# Patient Record
Sex: MALE | Race: WHITE | NOT HISPANIC OR LATINO | Employment: FULL TIME | URBAN - METROPOLITAN AREA
[De-identification: names, ages, dates, MRNs, and addresses within clinical notes are randomized per-mention and may not be internally consistent; named-entity substitution may affect disease eponyms.]

---

## 2017-03-07 ENCOUNTER — TELEPHONE (OUTPATIENT)
Dept: FAMILY MEDICINE | Facility: CLINIC | Age: 62
End: 2017-03-07

## 2017-03-07 NOTE — TELEPHONE ENCOUNTER
Panel Management Review      Patient has the following on his problem list: None      Composite cancer screening  Chart review shows that this patient is due/due soon for the following Colonoscopy  Summary:    Patient is due/failing the following:   COLONOSCOPY    Action needed:   Patient needs referral/order: Colonoscopy    Type of outreach:    Phone, left message for patient to call back.     Questions for provider review:    None                                                                                                                                    Grisel Dutton MA       Chart routed to Care Team [id number] .

## 2017-04-04 ENCOUNTER — TELEPHONE (OUTPATIENT)
Dept: FAMILY MEDICINE | Facility: CLINIC | Age: 62
End: 2017-04-04

## 2017-04-04 NOTE — LETTER
[address] [address]  [phone]  April 18, 2017    Chris Camarena  [facility] [address]    Dear Chris,    I care about your health and have reviewed your health plan. I have reviewed your medical conditions, medication list, and lab results and am making recommendations based on this review, to better manage your health.    You are in particular need of attention regarding:  -Colon Cancer Screening    I am recommending that you:  -schedule a COLONOSCOPY to look for colon cancer (due every 10 years or 5 years in higher risk situations.)   Colon cancer is now the second leading cause of death in the [place] for both men and women and there are over 130,000 new cases and 50,000 deaths per year from colon cancer.  Colonoscopies can prevent 90-95% of these deaths.  Problem lesions can be removed before they ever become cancer.  This test is not only looking for cancer, but also getting rid of precancerious lesions.  If you do not wish to do a colonoscopy or cannot afford to do one, at this time, there is another option. It is called a FIT test or Fecal Immunochemical Occult Blood Test (take home stool sample kit).  It does not replace the colonoscopy for colorectal cancer screening, but it can detect hidden bleeding in the lower colon.  It does need to be repeated every year and if a positive result is obtained, you would be referred for a colonoscopy.  If you have completed either one of these tests at another facility, please have the records sent to our clinic so that we can best coordinate your care.    Here is a list of Health Maintenance topics that are due now or due soon:  Health Maintenance Due   Topic Date Due     TETANUS IMMUNIZATION (SYSTEM ASSIGNED)  01/18/1974     ADVANCE DIRECTIVE PLANNING Q5 YRS (NO INBASKET)  01/18/1974     COLON CANCER SCREEN (SYSTEM ASSIGNED)  01/18/2006       Please call us  at [phone] (or use [employer]) to address the above recommendations.     Thank you for trusting [facility] and we appreciate the opportunity to serve you.  We look forward to supporting your healthcare needs in the future.    Healthy Regards,    Luca Colunga MD  [id number]

## 2017-04-04 NOTE — TELEPHONE ENCOUNTER
Panel Management Review      Patient has the following on his problem list: None      Composite cancer screening  Chart review shows that this patient is due/due soon for the following Colonoscopy  Summary:    Patient is due/failing the following:   COLONOSCOPY    Action needed:   Patient needs referral/order: Colonoscopy    Type of outreach:    Phone, left message for patient to call back.     Questions for provider review:    None                                                                                                                                    Grisel Dutton MA       Chart routed to Care Team .[id number]

## 2017-05-30 ENCOUNTER — TELEPHONE (OUTPATIENT)
Dept: FAMILY MEDICINE | Facility: CLINIC | Age: 62
End: 2017-05-30

## 2017-05-30 NOTE — TELEPHONE ENCOUNTER
Panel Management Review      Patient has the following on his problem list: None      Composite cancer screening  Chart review shows that this patient is due/due soon for the following Colonoscopy  Summary:    Patient is due/failing the following:   COLONOSCOPY    Action needed:   Patient needs referral/order: Colonoscopy    Type of outreach:    Phone, left message for patient to call back.     Questions for provider review:    None                                                                                                                                    Grisel Dutton MA       Chart routed to Care Team [id number] .

## 2017-05-30 NOTE — LETTER
[facility]  [address] [place] [address]  [phone]  June 6, 2017    Chris Camarena  [facility] [address]    Dear Chris,    I care about your health and have reviewed your health plan. I have reviewed your medical conditions, medication list, and lab results and am making recommendations based on this review, to better manage your health.    You are in particular need of attention regarding:  -Colon Cancer Screening    I am recommending that you:  {recommendations:-schedule a COLONOSCOPY to look for colon cancer (due every 10 years or 5 years in higher risk situations.)   Colon cancer is now the second leading cause of death in the [place] for both men and women and there are over 130,000 new cases and 50,000 deaths per year from colon cancer.  Colonoscopies can prevent 90-95% of these deaths.  Problem lesions can be removed before they ever become cancer.  This test is not only looking for cancer, but also getting rid of precancerious lesions.  If you do not wish to do a colonoscopy or cannot afford to do one, at this time, there is another option. It is called a FIT test or Fecal Immunochemical Occult Blood Test (take home stool sample kit).  It does not replace the colonoscopy for colorectal cancer screening, but it can detect hidden bleeding in the lower colon.  It does need to be repeated every year and if a positive result is obtained, you would be referred for a colonoscopy.  If you have completed either one of these tests at another facility, please have the records sent to our clinic so that we can best coordinate your care.    Here is a list of Health Maintenance topics that are due now or due soon:  Health Maintenance Due   Topic Date Due     TETANUS IMMUNIZATION (SYSTEM ASSIGNED)  01/18/1974     ADVANCE DIRECTIVE PLANNING Q5 YRS  01/18/1974     COLON CANCER SCREEN (SYSTEM ASSIGNED)  01/18/2006       Please call  us at [phone] (or use [employer]) to address the above recommendations.     Thank you for trusting [facility] and we appreciate the opportunity to serve you.  We look forward to supporting your healthcare needs in the future.    Healthy Regards,    Luca Colunga MD

## 2017-08-08 ENCOUNTER — TELEPHONE (OUTPATIENT)
Dept: FAMILY MEDICINE | Facility: CLINIC | Age: 62
End: 2017-08-08

## 2017-08-08 NOTE — LETTER
[facility]  [address] [place] [address]  [phone]  September 15, 2017    Chris Camarena  [facility] [address]    Dear Chris,    I care about your health and have reviewed your health plan. I have reviewed your medical conditions, medication list, and lab results and am making recommendations based on this review, to better manage your health.    You are in particular need of attention regarding:  -Colon Cancer Screening    I am recommending that you:  {recommendations:-schedule a COLONOSCOPY to look for colon cancer (due every 10 years or 5 years in higher risk situations.)   Colon cancer is now the second leading cause of death in the [place] for both men and women and there are over 130,000 new cases and 50,000 deaths per year from colon cancer.  Colonoscopies can prevent 90-95% of these deaths.  Problem lesions can be removed before they ever become cancer.  This test is not only looking for cancer, but also getting rid of precancerious lesions.  If you do not wish to do a colonoscopy or cannot afford to do one, at this time, there is another option. It is called a FIT test or Fecal Immunochemical Occult Blood Test (take home stool sample kit).  It does not replace the colonoscopy for colorectal cancer screening, but it can detect hidden bleeding in the lower colon.  It does need to be repeated every year and if a positive result is obtained, you would be referred for a colonoscopy.  If you have completed either one of these tests at another facility, please have the records sent to our clinic so that we can best coordinate your care.    Here is a list of Health Maintenance topics that are due now or due soon:  Health Maintenance Due   Topic Date Due     TETANUS IMMUNIZATION (SYSTEM ASSIGNED)  01/18/1974     COLON CANCER SCREEN (SYSTEM ASSIGNED)  01/18/2006     ADVANCE DIRECTIVE PLANNING Q5 YRS  01/18/2011     INFLUENZA  VACCINE (SYSTEM ASSIGNED)  09/01/2017       Please call us at [phone] (or use [employer]) to address the above recommendations.     Thank you for trusting [facility] and we appreciate the opportunity to serve you.  We look forward to supporting your healthcare needs in the future.    Healthy Regards,    [facility]

## 2017-08-08 NOTE — TELEPHONE ENCOUNTER
Panel Management Review      Patient has the following on his problem list: None      Composite cancer screening  Chart review shows that this patient is due/due soon for the following Colonoscopy  Summary:    Patient is due/failing the following:   COLONOSCOPY    Action needed:   Patient needs referral/order: Colonoscopy    Type of outreach:    Phone, left message for patient to call back.     Questions for provider review:    None                                                                                                                                    Grisel Dutton MA       Chart routed to Care Team [id number]   .

## 2019-04-03 ENCOUNTER — TELEPHONE (OUTPATIENT)
Dept: PEDIATRICS | Facility: CLINIC | Age: 64
End: 2019-04-03

## 2019-04-03 NOTE — TELEPHONE ENCOUNTER
Reason for Call:  Other appointment    Detailed comments: Patient sent in a web request looking to schedule an appointment for upper back pain and to re-establish care. Last office visit was May of 2014. Would you be willing to re-establish care with this patient? If so, please give patient a call to schedule. Thank you.    Phone Number Patient can be reached at: Home number on file [phone] (home)    Best Time: Anytime.    Can we leave a detailed message on this number? YES    Call taken on 4/3/2019 at 4:08 PM by Prateek Kang

## 2019-04-15 ENCOUNTER — ANCILLARY PROCEDURE (OUTPATIENT)
Dept: GENERAL RADIOLOGY | Facility: CLINIC | Age: 64
End: 2019-04-15
Attending: INTERNAL MEDICINE
Payer: COMMERCIAL

## 2019-04-15 ENCOUNTER — OFFICE VISIT (OUTPATIENT)
Dept: PEDIATRICS | Facility: CLINIC | Age: 64
End: 2019-04-15
Payer: COMMERCIAL

## 2019-04-15 VITALS
SYSTOLIC BLOOD PRESSURE: 138 MMHG | OXYGEN SATURATION: 96 % | TEMPERATURE: 98.7 F | BODY MASS INDEX: 36.73 KG/M2 | DIASTOLIC BLOOD PRESSURE: 62 MMHG | WEIGHT: 248 LBS | HEART RATE: 89 BPM | HEIGHT: 69 IN

## 2019-04-15 DIAGNOSIS — M54.9 UPPER BACK PAIN: Primary | ICD-10-CM

## 2019-04-15 DIAGNOSIS — L82.1 SEBORRHEIC KERATOSES: ICD-10-CM

## 2019-04-15 DIAGNOSIS — M54.9 UPPER BACK PAIN: ICD-10-CM

## 2019-04-15 DIAGNOSIS — G47.33 OSA (OBSTRUCTIVE SLEEP APNEA): ICD-10-CM

## 2019-04-15 DIAGNOSIS — Z72.0 TOBACCO USE: ICD-10-CM

## 2019-04-15 PROCEDURE — 99214 OFFICE O/P EST MOD 30 MIN: CPT | Performed by: INTERNAL MEDICINE

## 2019-04-15 PROCEDURE — 71046 X-RAY EXAM CHEST 2 VIEWS: CPT

## 2019-04-15 RX ORDER — BUPROPION HYDROCHLORIDE 150 MG/1
TABLET, FILM COATED, EXTENDED RELEASE ORAL
Qty: 60 TABLET | Refills: 2 | Status: SHIPPED | OUTPATIENT
Start: 2019-04-15 | End: 2019-05-10

## 2019-04-15 ASSESSMENT — MIFFLIN-ST. JEOR: SCORE: 1902.36

## 2019-04-15 NOTE — PROGRESS NOTES
SUBJECTIVE:   Chris Camarena is a 64 year old male who presents to clinic today for the following   health issues:      Concern - Moles  Onset: years    Description:   Changed color and size over time     Intensity: mild, moderate    Progression of Symptoms:  worsening    Accompanying Signs & Symptoms:  None    Previous history of similar problem:   Long term sx    Precipitating factors:   Worsened by: None    Alleviating factors:  Improved by: None    Therapies Tried and outcome: None     Back pain    Onset: few weeks, but recurrent similar episodes for 7-8 years    Description:   Location: upper back left side  Character: Sharp, Dull ache, Stabbing and Cramping    Intensity: severe    Progression of Symptoms: better    Accompanying Signs & Symptoms:  Other symptoms: no paresthesias    History:   Previous similar pain: no       Precipitating factors:   Trauma or overuse: no     Alleviating factors:  Improved by: massage, then started to hurt     Therapies Tried and outcome: ibuprofen, has helped        Patient Active Problem List   Diagnosis     KAJAL (obstructive sleep apnea)     CARDIOVASCULAR SCREENING; LDL GOAL LESS THAN 130     Tobacco use     Elevated blood pressure reading without diagnosis of hypertension     Palpitations     Elevated blood sugar     History reviewed. No pertinent surgical history.    Social History     Tobacco Use     Smoking status: Current Every Day Smoker     Packs/day: 1.00     Years: 40.00     Pack years: 40.00     Types: Cigarettes     Smokeless tobacco: Never Used   Substance Use Topics     Alcohol use: Yes     Alcohol/week: 0.5 oz     Types: 1 Cans of beer per week     Comment: daily- 1 beer a day      Family History   Problem Relation Age of Onset     Heart Disease Mother      Diabetes Mother      Respiratory Mother      Respiratory Father          Current Outpatient Medications   Medication Sig Dispense Refill             ibuprofen (ADVIL,MOTRIN) 600 MG tablet Take by mouth  "every 6 hours as needed         ROS: The following systems have been completely reviewed and are negative except as noted in the HPI: CONSTITUTIONAL,  GASTROINTESTINAL, GENITOURINARY, NEUROLOGIC    OBJECTIVE:                                                    /62 (BP Location: Right arm, Patient Position: Chair, Cuff Size: Adult Large)   Pulse 89   Temp 98.7  F (37.1  C) (Oral)   Ht 1.74 m (5' 8.5\")   Wt 112.5 kg (248 lb)   SpO2 96%   BMI 37.16 kg/m   Body mass index is 37.16 kg/m .  GENERAL:  alert,  no distress  Derm: Scattered brown raised exophytic plaques with well demarcated border and stuck on appearance mainly on back  RESP: lungs clear to auscultation - no rales, no rhonchi, no wheezes  CV: regular rates and rhythm, normal S1 S2  Back:   Without midline cervical or thoracic spinous tenderness.  Generalized subjective tenderness over the left trapezius and thoracic paraspinous muscles adjacent to the scapula  MS: extremities- no edema     ASSESSMENT/PLAN:                                                        ICD-10-CM    1. Upper back pain M54.9 XR Chest 2 Views     Chest x-ray obtained today is negative.  Suspect musculoskeletal upper back pain, recurrent over the past 7-8 years.  Recommend course of physical therapy.  Patient declines PT but will call in the next few weeks if symptoms fail to improve with home stretches     2. Seborrheic keratoses L82.1  diagnosis discussed.     3. KAJAL (obstructive sleep apnea) G47.33 SLEEP EVALUATION & MANAGEMENT REFERRAL - [facility]  [phone] (Age 18 and up)  Additional concerns regarding ongoing sleep apnea for several years.  Partner has witnessed apneic periods.  Referred to sleep center for sleep study     4. Tobacco use Z72.0 buPROPion (ZYBAN) 150 MG 12 hr tablet     40-pack-year smoker.  Discussed screening chest CT which patient declines today.  Patient is interested in bupropion for smoking cessation.        Kenrick" Kathi OVALLE  [facility]

## 2019-04-15 NOTE — PATIENT INSTRUCTIONS
Set your quit date, start zyban 1 week prior    Chest xray today is normal.   If pain persists in 2 weeks we can set up physical therapy.    Lung cancer screening.  Let us know if you want to proceed with lung cancer screening CT

## 2019-05-09 ENCOUNTER — TELEPHONE (OUTPATIENT)
Dept: PEDIATRICS | Facility: CLINIC | Age: 64
End: 2019-05-09

## 2019-05-09 DIAGNOSIS — Z72.0 TOBACCO USE: ICD-10-CM

## 2019-05-09 NOTE — TELEPHONE ENCOUNTER
Reason for call:  Other   Patient called regarding (reason for call): prescription  Additional comments: send the RX for bupropion (ZYBAN) 150 MG 12 hr tablet to the updated pharmacy of [facility] on [address]     Phone number to reach patient:  Home number on file [phone] (home)    Best Time:  any    Can we leave a detailed message on this number?  YES

## 2019-05-10 RX ORDER — BUPROPION HYDROCHLORIDE 150 MG/1
TABLET, FILM COATED, EXTENDED RELEASE ORAL
Qty: 60 TABLET | Refills: 1 | Status: SHIPPED | OUTPATIENT
Start: 2019-05-10 | End: 2021-02-10

## 2019-08-26 ENCOUNTER — TELEPHONE (OUTPATIENT)
Dept: PEDIATRICS | Facility: CLINIC | Age: 64
End: 2019-08-26

## 2019-08-26 ENCOUNTER — TRANSFERRED RECORDS (OUTPATIENT)
Dept: HEALTH INFORMATION MANAGEMENT | Facility: CLINIC | Age: 64
End: 2019-08-26

## 2019-08-26 NOTE — TELEPHONE ENCOUNTER
"Reason for Call:  Other call back    Detailed comments: Patient sent the following message via [employer] appointment request:    \"Earliest appointment possible to discuss and evaluate recent issues with irregular heartbeat and very intermittent minor chest pain.\"    Please contact patient by phone or by [employer].    Call taken on 8/26/2019 at 12:09 PM by Kaylan Marin    "

## 2019-08-26 NOTE — TELEPHONE ENCOUNTER
Patient was evaluated in ER and discharged with recommendation to follow up with PCP.     MA/TC:    Please reach out to patient to schedule ER follow up appointment.         ED / Discharge Outreach Protocol    Patient Contact    Attempt # 1    Was call answered?  No.  Left message on voicemail with information to call me back.    Winter Encarnacion RN BSN   [facility]

## 2019-08-27 NOTE — TELEPHONE ENCOUNTER
Left detailed message regarding ER follow up with PCP, per Dr. Armenta and Elle GARRETT ok'd to use huddle time @1pm tomorrow 8/28/19.     If that works for patient please remove huddle and schedule, if not will have to find another day/time to fit in.       //Zita Perez MA// August 27, 2019 10:23 AM

## 2019-08-27 NOTE — TELEPHONE ENCOUNTER
Patient sent a mychart appointment request. I sent him a mychart and  I offered him the appointment tomorrow 8/28 at 1pm with Dr Armenta. I will await patients response.

## 2019-08-27 NOTE — TELEPHONE ENCOUNTER
2nd attempt, LVM to have pt call back to schedule per note below. Please assist pt in scheduling upon call back.     Maira Zuluaga,     on 8/27/2019 at 11:07 AM

## 2019-08-28 NOTE — TELEPHONE ENCOUNTER
Patient made an appt. For today with Dr.Gaul. Rosales, [facility]

## 2020-03-02 ENCOUNTER — HEALTH MAINTENANCE LETTER (OUTPATIENT)
Age: 65
End: 2020-03-02

## 2020-12-20 ENCOUNTER — HEALTH MAINTENANCE LETTER (OUTPATIENT)
Age: 65
End: 2020-12-20

## 2021-02-08 ENCOUNTER — MYC MEDICAL ADVICE (OUTPATIENT)
Dept: PEDIATRICS | Facility: CLINIC | Age: 66
End: 2021-02-08

## 2021-02-09 ENCOUNTER — MYC MEDICAL ADVICE (OUTPATIENT)
Dept: PEDIATRICS | Facility: CLINIC | Age: 66
End: 2021-02-09

## 2021-02-09 NOTE — TELEPHONE ENCOUNTER
Next 5 appointments (look out 90 days)    Feb 10, 2021 11:20 AM  (Arrive by 10:55 AM)  Adult Preventative Visit with Kenrick Armenta MD  [facility] ([facility] ) [address] [place], [place] [address] [place] [address] [phone]

## 2021-02-09 NOTE — TELEPHONE ENCOUNTER
Next 5 appointments (look out 90 days)    Feb 10, 2021 11:20 AM  (Arrive by 10:55 AM)  Adult Preventative Visit with Kenrick Armenta MD  [facility] ([facility] ) [address] [place], [place] [address] [place] [address] [phone]

## 2021-02-10 ENCOUNTER — OFFICE VISIT (OUTPATIENT)
Dept: PEDIATRICS | Facility: CLINIC | Age: 66
End: 2021-02-10
Payer: COMMERCIAL

## 2021-02-10 VITALS
BODY MASS INDEX: 35.57 KG/M2 | HEART RATE: 79 BPM | DIASTOLIC BLOOD PRESSURE: 74 MMHG | TEMPERATURE: 98.5 F | HEIGHT: 68 IN | WEIGHT: 234.7 LBS | OXYGEN SATURATION: 97 % | SYSTOLIC BLOOD PRESSURE: 136 MMHG

## 2021-02-10 DIAGNOSIS — I87.2 VENOUS STASIS DERMATITIS, UNSPECIFIED LATERALITY: ICD-10-CM

## 2021-02-10 DIAGNOSIS — H53.8 VISION BLURRED: ICD-10-CM

## 2021-02-10 DIAGNOSIS — Z12.5 SCREENING FOR PROSTATE CANCER: ICD-10-CM

## 2021-02-10 DIAGNOSIS — Z00.00 ENCOUNTER FOR MEDICARE ANNUAL WELLNESS EXAM: Primary | ICD-10-CM

## 2021-02-10 DIAGNOSIS — G47.33 OSA (OBSTRUCTIVE SLEEP APNEA): ICD-10-CM

## 2021-02-10 DIAGNOSIS — E78.5 HYPERLIPIDEMIA LDL GOAL <130: ICD-10-CM

## 2021-02-10 DIAGNOSIS — E66.01 MORBID OBESITY (H): ICD-10-CM

## 2021-02-10 DIAGNOSIS — I89.0 LYMPHEDEMA: ICD-10-CM

## 2021-02-10 DIAGNOSIS — Z72.0 TOBACCO USE: ICD-10-CM

## 2021-02-10 LAB — HBA1C MFR BLD: 5.3 % (ref 0–5.6)

## 2021-02-10 PROCEDURE — 83036 HEMOGLOBIN GLYCOSYLATED A1C: CPT | Performed by: INTERNAL MEDICINE

## 2021-02-10 PROCEDURE — 80061 LIPID PANEL: CPT | Performed by: INTERNAL MEDICINE

## 2021-02-10 PROCEDURE — G0103 PSA SCREENING: HCPCS | Performed by: INTERNAL MEDICINE

## 2021-02-10 PROCEDURE — 80053 COMPREHEN METABOLIC PANEL: CPT | Performed by: INTERNAL MEDICINE

## 2021-02-10 PROCEDURE — 99213 OFFICE O/P EST LOW 20 MIN: CPT | Mod: 25 | Performed by: INTERNAL MEDICINE

## 2021-02-10 PROCEDURE — 36415 COLL VENOUS BLD VENIPUNCTURE: CPT | Performed by: INTERNAL MEDICINE

## 2021-02-10 PROCEDURE — 99397 PER PM REEVAL EST PAT 65+ YR: CPT | Performed by: INTERNAL MEDICINE

## 2021-02-10 RX ORDER — NICOTINE 21 MG/24HR
1 PATCH, TRANSDERMAL 24 HOURS TRANSDERMAL EVERY 24 HOURS
Qty: 28 PATCH | Refills: 0 | Status: SHIPPED | OUTPATIENT
Start: 2021-02-10

## 2021-02-10 ASSESSMENT — ENCOUNTER SYMPTOMS
NERVOUS/ANXIOUS: 0
SHORTNESS OF BREATH: 1
FEVER: 0
ABDOMINAL PAIN: 0
HEADACHES: 0
NAUSEA: 0
HEMATOCHEZIA: 0
HEARTBURN: 0
CONSTIPATION: 0
DIARRHEA: 0
SORE THROAT: 0
COUGH: 0
ARTHRALGIAS: 0
HEMATURIA: 0
DYSURIA: 0
PARESTHESIAS: 0
MYALGIAS: 0
JOINT SWELLING: 0
CHILLS: 0
EYE PAIN: 0
DIZZINESS: 0
FREQUENCY: 0
PALPITATIONS: 0

## 2021-02-10 ASSESSMENT — ACTIVITIES OF DAILY LIVING (ADL): CURRENT_FUNCTION: NO ASSISTANCE NEEDED

## 2021-02-10 ASSESSMENT — MIFFLIN-ST. JEOR: SCORE: 1824.09

## 2021-02-10 NOTE — PROGRESS NOTES
"SUBJECTIVE:   CC: Chris Camarena is an 66 year old male who presents for preventative health visit.     Patient has been advised of split billing requirements and indicates understanding: Yes     Healthy Habits:     In general, how would you rate your overall health?  Fair    Frequency of exercise:  2-3 days/week    Duration of exercise:  Other    Do you usually eat at least 4 servings of fruit and vegetables a day, include whole grains    & fiber and avoid regularly eating high fat or \"junk\" foods?  No    Taking medications regularly:  Yes    Medication side effects:  None    Ability to successfully perform activities of daily living:  No assistance needed    Home Safety:  No safety concerns identified    Hearing Impairment:  Difficulty following a conversation in a noisy restaurant or crowded room, difficulty following dialogue in the theater, difficult to understand a speaker at a public meeting or [employer] service, need to ask people to speak up or repeat themselves, difficulty understanding soft or whispered speech and difficulty understanding speech on the telephone    In the past 6 months, have you been bothered by leaking of urine?  No    In general, how would you rate your overall mental or emotional health?  Good      PHQ-2 Total Score: 0    Additional concerns today:  Yes    Concerns today:  1-40-pack-year smoking history.  Discussed risks and benefits of low-dose CT scanning today patient is interested in lung cancer screening.  2-recent acute change in vision approximately 1 week ago.  1 week ago patient developed acute onset of bright lights left field of vision with subsequent blurring of vision in the left eye which has persisted since that time.  Denies headache or eye pain or other focal neurologic symptoms  3-concerns regarding a persistent rash involving the right lower extremity.  Has had prior problems with this leg secondary to varicose veins and a prior superficial injury several years " ago.  Notes right lower extremity swelling and skin irritation at times.Body mass index is 35.69 kg/m .   4-concerns regarding poor sleep and loud snoring, questions about testing for obstructive sleep apnea.  5-long tobacco history.  Patient is planning on [facility] in the next year or 2 and is interested in smoking cessation.  Previously took Zyban but had side effects.  Uncomfortable with the potential side effects from Chantix.      Today's PHQ-2 Score:   PHQ-2 ( 1999 [employer]) 2/10/2021   Q1: Little interest or pleasure in doing things 0   Q2: Feeling down, depressed or hopeless 0   PHQ-2 Score 0   Q1: Little interest or pleasure in doing things Not at all   Q2: Feeling down, depressed or hopeless Not at all   PHQ-2 Score 0       Abuse: Current or Past(Physical, Sexual or Emotional)- No  Do you feel safe in your environment? Yes    Have you ever done Advance Care Planning? (For example, a Health Directive, POLST, or a discussion with a medical provider or your loved ones about your wishes): No, advance care planning information given to patient to review.  Patient plans to discuss their wishes with loved ones or provider.      Social History     Tobacco Use     Smoking status: Current Every Day Smoker     Packs/day: 1.00     Years: 40.00     Pack years: 40.00     Types: Cigarettes     Smokeless tobacco: Never Used   Substance Use Topics     Alcohol use: Yes     Alcohol/week: 0.8 standard drinks     Types: 1 Cans of beer per week     Comment: daily- 1 beer a day        Alcohol Use 2/10/2021   Prescreen: >3 drinks/day or >7 drinks/week? Yes   AUDIT SCORE  4       Last PSA:   PSA   Date Value Ref Range Status   02/10/2021 1.39 0 - 4 ug/L Final     Comment:     Assay Method:  Chemiluminescence using Siemens Vista analyzer       Reviewed orders with patient. Reviewed health maintenance and updated orders accordingly - Yes  Lab work is in process    Reviewed and updated as needed this visit by clinical staff  Tobacco   Allergies    Med Hx  Surg Hx  Fam Hx  Soc Hx        Reviewed and updated as needed this visit by Provider                Patient Active Problem List   Diagnosis     KAJAL (obstructive sleep apnea)     CARDIOVASCULAR SCREENING; LDL GOAL LESS THAN 130     Tobacco use     Elevated blood pressure reading without diagnosis of hypertension     Palpitations     Elevated blood sugar     Hyperlipidemia LDL goal <130     Morbid obesity (H)     Past Medical History:   Diagnosis Date     Elevated blood pressure reading without diagnosis of hypertension 7/22/2016     Elevated blood sugar 7/25/2016     Palpitations 7/22/2016     Sleep apnea 20 years ago     had study [facility] of [facility]      Tobacco use        History reviewed. No pertinent surgical history.    Family History   Problem Relation Age of Onset     Heart Disease Mother      Diabetes Mother      Respiratory Mother      Respiratory Father        ALLERGIES   No Known Allergies         Review of Systems   Constitutional: Negative for chills and fever.   HENT: Positive for hearing loss. Negative for congestion, ear pain and sore throat.    Eyes: Positive for visual disturbance. Negative for pain.   Respiratory: Positive for shortness of breath. Negative for cough.    Cardiovascular: Negative for chest pain, palpitations and peripheral edema.   Gastrointestinal: Negative for abdominal pain, constipation, diarrhea, heartburn, hematochezia and nausea.   Genitourinary: Negative for discharge, dysuria, frequency, genital sores, hematuria, impotence and urgency.   Musculoskeletal: Negative for arthralgias, joint swelling and myalgias.   Skin: Positive for rash.   Neurological: Negative for dizziness, headaches and paresthesias.   Psychiatric/Behavioral: Negative for mood changes. The patient is not nervous/anxious.        OBJECTIVE:   /74 (BP Location: Right arm, Patient Position: Sitting, Cuff Size: Adult Regular)   Pulse 79   Temp 98.5  F (36.9  C) (Tympanic)   Ht  "1.727 m (5' 8\")   Wt 106.5 kg (234 lb 11.2 oz)   SpO2 97%   BMI 35.69 kg/m      Physical Exam  GENERAL: healthy, alert and no distress  EYES: Eyes grossly normal to inspection, PERRL and conjunctivae and sclerae normal  HENT: ear canals and TM's normal, nose and mouth without ulcers or lesions  NECK: no adenopathy, no asymmetry, masses, or scars and thyroid normal to palpation  RESP: lungs clear to auscultation - no rales, rhonchi or wheezes  CV: regular rate and rhythm, normal S1 S2, no S3 or S4, no murmur, click or rub, no peripheral edema and peripheral pulses strong  ABDOMEN: soft, nontender, no hepatosplenomegaly, no masses and bowel sounds normal  MS: no gross musculoskeletal defects noted, no edema  SKIN: right lower leg:  Diffuse hyperpigmentation, varicose veins and nonpitting edema   NEURO: Normal strength and tone, mentation intact and speech normal  PSYCH: mentation appears normal, affect normal/bright    ASSESSMENT/PLAN:       ICD-10-CM    1. Encounter for Medicare annual wellness exam  Z00.00        2. Vision blurred  H53.8 EYE ADULT REFERRAL         Acute vision abnormality started 9 days ago-- Cv risk factors/tobacco use.  R/o BRV or BRA occlusion, retinal detachment.  Pt will be seen by Ophthalmology in the next 24hrs     3. KAJAL (obstructive sleep apnea)  G47.33 SLEEP EVALUATION & MANAGEMENT REFERRAL - ADULT -[facility] [phone]  (Age 18 and up)     KAJAL discussed, recommended sleep study        4. Hyperlipidemia LDL goal <130  E78.5 Comprehensive metabolic panel     Lipid panel reflex to direct LDL Fasting     5. Venous stasis dermatitis, unspecified laterality  I87.2 Compression Sleeve/Stocking Order for DME - ONLY FOR DME           6. Lymphedema  I89.0 Compression Sleeve/Stocking Order for DME - ONLY FOR DME        Right lower extremity hyperpigmentation, exam findings consistent with chronic lymphedema with associated stasis dermatitis. Recommended compression " "stocking use during daytime/off at night  Discussed weight loss goals and association between obesity and lymphedema     7. Tobacco use  Z72.0      CT Chest Lung Cancer Scrn Low Dose wo     nicotine (NICODERM CQ) 21 MG/24HR 24 hr patch     nicotine (NICODERM CQ) 14 MG/24HR 24 hr patch     nicotine (NICODERM CQ) 7 MG/24HR 24 hr patch      40-pack-year history. Discussed pros and cons of low-dose CT screening. Patient agrees to proceed. In addition discussed smoking cessation. Patient declines trial of Chantix. Recommended setting a quit date start nicotine patch        8. Morbid obesity (H)  E66.01 Hemoglobin A1c     9. Screening for prostate cancer  Z12.5 Prostate spec antigen screen       Patient has been advised of split billing requirements and indicates understanding: Yes  COUNSELING:   Reviewed preventive health counseling, as reflected in patient instructions       Regular exercise       Healthy diet/nutrition       Colon cancer screening       Prostate cancer screening    Estimated body mass index is 35.69 kg/m  as calculated from the following:    Height as of this encounter: 1.727 m (5' 8\").    Weight as of this encounter: 106.5 kg (234 lb 11.2 oz).     Weight management plan: Discussed healthy diet and exercise guidelines    He reports that he has been smoking cigarettes. He has a 40.00 pack-year smoking history. He has never used smokeless tobacco.  Tobacco Cessation Action Plan:   Pharmacotherapies : Nicotine patch      Counseling Resources:  ATP IV Guidelines  Pooled Cohorts Equation Calculator  FRAX Risk Assessment  ICSI Preventive Guidelines  Dietary Guidelines for Americans, 2010  [employer] MyPlate  ASA Prophylaxis  Lung CA Screening    Kenrick Armenta MD, MD  [facility]  "

## 2021-02-10 NOTE — PATIENT INSTRUCTIONS
Preventive Health Recommendations:     See your health care provider every year to    Review health changes.     Discuss preventive care.      Review your medicines if your doctor has prescribed any.      Talk with your health care provider about whether you should have a test to screen for prostate cancer (PSA).    Every 3 years, have a diabetes test (fasting glucose). If you are at risk for diabetes, you should have this test more often.    Every 5 years, have a cholesterol test. Have this test more often if you are at risk for high cholesterol or heart disease.     Every 10 years, have a colonoscopy. Or, have a yearly FIT test (stool test). These exams will check for colon cancer.    Talk to with your health care provider about screening for Abdominal Aortic Aneurysm if you have a family history of AAA or have a history of smoking.    Shots:     Get a flu shot each year.     Get a tetanus shot every 10 years.     Talk to your doctor about your pneumonia vaccines. There are now two you should receive - Pneumovax (PPSV 23) and Prevnar (PCV 13).     Talk to your pharmacist about a shingles vaccine.     Talk to your doctor about the hepatitis B vaccine.  Nutrition:     Eat at least 5 servings of fruits and vegetables each day.     Eat whole-grain bread, whole-wheat pasta and brown rice instead of white grains and rice.     Get adequate Calcium and Vitamin D.   Lifestyle    Exercise for at least 150 minutes a week (30 minutes a day, 5 days a week). This will help you control your weight and prevent disease.     Limit alcohol to one drink per day.     No smoking.     Wear sunscreen to prevent skin cancer.    See your dentist every six months for an exam and cleaning.    See your eye doctor every 1 to 2 years to screen for conditions such as glaucoma, macular degeneration, cataracts, etc.    Personalized Prevention Plan  You are due for the preventive services outlined below.  Your care team is available to assist you  in scheduling these services.  If you have already completed any of these items, please share that information with your care team to update in your medical record.  Health Maintenance Due   Topic Date Due     ANNUAL REVIEW OF HM ORDERS  1955     Discuss Advance Care Planning  1955     Colorectal Cancer Screening  01/18/1966     HIV Screening  01/18/1971     Diptheria Tetanus Pertussis (DTAP/TDAP/TD) Vaccine (1 - Tdap) 01/18/1981     Zoster (Shingles) Vaccine (1 of 2) 01/18/2006     Cholesterol Lab  01/07/2019     Flu Vaccine (1) 09/01/2020     FALL RISK ASSESSMENT  01/18/2021     AORTIC ANEURYSM SCREENING (SYSTEM ASSIGNED)  01/18/2021     Annual Wellness Visit  01/18/2021     Pneumococcal Vaccine (1 of 1 - PPSV23) 01/18/2021     Patient Education   Personalized Prevention Plan  You are due for the preventive services outlined below.  Your care team is available to assist you in scheduling these services.  If you have already completed any of these items, please share that information with your care team to update in your medical record.  Health Maintenance Due   Topic Date Due     ANNUAL REVIEW OF HM ORDERS  1955     Colorectal Cancer Screening  01/18/1966     HIV Screening  01/18/1971     Diptheria Tetanus Pertussis (DTAP/TDAP/TD) Vaccine (1 - Tdap) 01/18/1981     Zoster (Shingles) Vaccine (1 of 2) 01/18/2006     Cholesterol Lab  01/07/2019     Flu Vaccine (1) 09/01/2020     FALL RISK ASSESSMENT  01/18/2021     AORTIC ANEURYSM SCREENING (SYSTEM ASSIGNED)  01/18/2021     Pneumococcal Vaccine (1 of 1 - PPSV23) 01/18/2021     Preventive Health Recommendations  See your health care provider every year to    Review health changes.     Discuss preventive care.      Review your medicines if your doctor has prescribed any.    Talk with your health care provider about whether you should have a test to screen for prostate cancer (PSA).    Every 3 years, have a diabetes test (fasting glucose). If you are at  risk for diabetes, you should have this test more often.    Every 5 years, have a cholesterol test. Have this test more often if you are at risk for high cholesterol or heart disease.     Every 10 years, have a colonoscopy. Or, have a yearly FIT test (stool test). These exams will check for colon cancer.    Talk to with your health care provider about screening for Abdominal Aortic Aneurysm if you have a family history of AAA or have a history of smoking.    Shots:     Get a flu shot each year.     Get a tetanus shot every 10 years.     Talk to your doctor about your pneumonia vaccines. There are now two you should receive - Pneumovax (PPSV 23) and Prevnar (PCV 13).    Talk to your pharmacist about a shingles vaccine.     Talk to your doctor about the hepatitis B vaccine.    Nutrition:     Eat at least 5 servings of fruits and vegetables each day.     Eat whole-grain bread, whole-wheat pasta and brown rice instead of white grains and rice.     Get adequate Calcium and Vitamin D.     Lifestyle    Exercise for at least 150 minutes a week (30 minutes a day, 5 days a week). This will help you control your weight and prevent disease.     Limit alcohol to one drink per day.     No smoking.     Wear sunscreen to prevent skin cancer.     See your dentist every six months for an exam and cleaning.     See your eye doctor every 1 to 2 years to screen for conditions such as glaucoma, macular degeneration and cataracts.    Personalized Prevention Plan  You are due for the preventive services outlined below.  Your care team is available to assist you in scheduling these services.  If you have already completed any of these items, please share that information with your care team to update in your medical record.  Health Maintenance   Topic Date Due     ANNUAL REVIEW OF HM ORDERS  1955     COLORECTAL CANCER SCREENING  01/18/1966     HIV SCREENING  01/18/1971     DTAP/TDAP/TD IMMUNIZATION (1 - Tdap) 01/18/1981     ZOSTER  IMMUNIZATION (1 of 2) 01/18/2006     LIPID  01/07/2019     INFLUENZA VACCINE (1) 09/01/2020     FALL RISK ASSESSMENT  01/18/2021     AORTIC ANEURYSM SCREENING (SYSTEM ASSIGNED)  01/18/2021     Pneumococcal Vaccine: 65+ Years (1 of 1 - PPSV23) 01/18/2021     MEDICARE ANNUAL WELLNESS VISIT  02/10/2022     ADVANCE CARE PLANNING  02/10/2026     HEPATITIS C SCREENING  Completed     PHQ-2  Completed     Pneumococcal Vaccine: Pediatrics (0 to 5 Years) and At-Risk Patients (6 to 64 Years)  Aged Out     IPV IMMUNIZATION  Aged Out     MENINGITIS IMMUNIZATION  Aged Out     HEPATITIS B IMMUNIZATION  Aged Out     Your Health Risk Assessment indicates you feel you are not in good health    A healthy lifestyle helps keep the body fit and the mind alert. It helps protect you from disease, helps you fight disease, and helps prevent chronic disease (disease that doesn't go away) from getting worse. This is important as you get older and begin to notice twinges in muscles and joints and a decline in the strength and stamina you once took for granted. A healthy lifestyle includes good healthcare, good nutrition, weight control, recreation, and regular exercise. Avoid harmful substances and do what you can to keep safe. Another part of a healthy lifestyle is stay mentally active and socially involved.    Good healthcare     Have a wellness visit every year.     If you have new symptoms, let us know right away. Don't wait until the next checkup.     Take medicines exactly as prescribed and keep your medicines in a safe place. Tell us if your medicine causes problems.   Healthy diet and weight control     Eat 3 or 4 small, nutritious, low-fat, high-fiber meals a day. Include a variety of fruits, vegetables, and whole-grain foods.     Make sure you get enough calcium in your diet. Calcium, vitamin D, and exercise help prevent osteoporosis (bone thinning).     If you live alone, try eating with others when you can. That way you get a good  meal and have company while you eat it.     Try to keep a healthy weight. If you eat more calories than your body uses for energy, it will be stored as fat and you will gain weight.     Recreation   Recreation is not limited to sports and team events. It includes any activity that provides relaxation, interest, enjoyment, and exercise. Recreation provides an outlet for physical, mental, and social energy. It can give a sense of worth and achievement. It can help you stay healthy.    Mental Exercise and Social Involvement  Mental and emotional health is as important as physical health. Keep in touch with friends and family. Stay as active as possible. Continue to learn and challenge yourself.   Things you can do to stay mentally active are:    Learn something new, like a foreign language or musical instrument.     Play SCRABBLE or do crossword puzzles. If you cannot find people to play these games with you at home, you can play them with others on your computer through the Internet.     Join a games club--anything from card games to chess or checkers or lawn bowling.     Start a new hobby.     Go back to school.     Volunteer.     Read.   Keep up with world events.    Understanding USDA MyPlate  The USDA has guidelines to help you make healthy food choices. These are called MyPlate. MyPlate shows the food groups that make up healthy meals using the image of a place setting. Before you eat, think about the healthiest choices for what to put on your plate or in your cup or bowl. To learn more about building a healthy plate, visit www.choosemyplate.gov.     The food groups    Fruits. Any fruit or 100% fruit juice counts as part of the Fruit Group. Fruits may be fresh, canned, frozen, or dried, and may be whole, cut-up, or pureed. Make 1/2 of your plate fruits and vegetables.    Vegetables. Any vegetable or 100% vegetable juice counts as a member of the Vegetable Group. Vegetables may be fresh, frozen, canned, or dried.  They can be served raw or cooked and may be whole, cut-up, or mashed. Make 1/2 of your plate fruits and vegetables.    Grains. All foods made from grains are part of the Grains Group. These include wheat, rice, oats, cornmeal, and barley. Grains are often used to make foods such as bread, pasta, oatmeal, cereal, tortillas, and grits. Grains should be no more than 1/4 of your plate. At least half of your grains should be whole grains.    Protein. This group includes meat, poultry, seafood, beans and peas, eggs, processed soy products (such as tofu), nuts (including nut butters), and seeds. Make protein choices no more than 1/4 of your plate. Meat and poultry choices should be lean or low fat.    Dairy. The Dairy Group includes all fluid milk products and foods made from milk that contain calcium, such as yogurt and cheese. (Foods that have little calcium, such as cream, butter, and cream cheese, are not part of this group.) Most dairy choices should be low-fat or fat-free.    Oils. Oils aren't a food group, but they do contain essential nutrients. However it's important to watch your intake of oils. These are fats that are liquid at room temperature. They include canola, corn, olive, soybean, vegetable, and sunflower oil. Foods that are mainly oil include mayonnaise, certain salad dressings, and soft margarines. You likely already get your daily oil allowance from the foods you eat.  Things to limit  Eating healthy also means limiting these things in your diet:    Salt (sodium). Many processed foods have a lot of sodium. To keep sodium intake down, eat fresh vegetables, meats, poultry, and seafood when possible. Purchase low-sodium, reduced-sodium, or no-salt-added food products at the store. And don't add salt to your meals at home. Instead, season them with herbs and spices such as dill, oregano, cumin, and paprika. Or try adding flavor with lemon or lime zest and juice.    Saturated fat. Saturated fats are most  often found in animal products such as beef, pork, and chicken. They are often solid at room temperature, such as butter. To reduce your saturated fat intake, choose leaner cuts of meat and poultry. And try healthier cooking methods such as grilling, broiling, roasting, or baking. For a simple lower-fat swap, use plain nonfat yogurt instead of mayonnaise when making potato salad or macaroni salad.    Added sugars. These are sugars added to foods. They are in foods such as ice cream, candy, soda, fruit drinks, sports drinks, energy drinks, cookies, pastries, jams, and syrups. Cut down on added sugars by sharing sweet treats with a family member or friend. You can also choose fruit for dessert, and drink water or other unsweetened beverages.  [employer] last reviewed this educational content on 6/1/2020 2000-2020 [employer]. [address] [place] [address]. All rights reserved. This information is not intended as a substitute for professional medical care. Always follow your healthcare professional's instructions.          Signs of Hearing Loss      Hearing much better with one ear can be a sign of hearing loss.   Hearing loss is a problem shared by many people. In fact, it is one of the most common health problems, particularly as people age. Most people age 65 and older have some hearing loss. By age 80, almost everyone does. Hearing loss often occurs slowly over the years. So you may not realize your hearing has gotten worse.  Have your hearing checked  Call your healthcare provider if you:    Have to strain to hear normal conversation    Have to watch other people s faces very carefully to follow what they re saying    Need to ask people to repeat what they ve said    Often misunderstand what people are saying    Turn the volume of the television or radio up so high that others complain    Feel that people are mumbling when they re talking to you    Find that the effort to hear leaves you  feeling tired and irritated    Notice, when using the phone, that you hear better with one ear than the other  [employer] last reviewed this educational content on 1/1/2020 2000-2020 [employer]. [address] [place] [address], [place] [address]. All rights reserved. This information is not intended as a substitute for professional medical care. Always follow your healthcare professional's instructions.           Nicotine Patch    Dosing:    >10 cigarettes per day Dose   Weeks 1-6 Use one 21 mg patch per day.   Weeks 7-8 Use one 14 mg patch per day.   Weeks 9-10 Use one 7 mg patch per day   <10 cigarettes per day  Weeks 1-6 Use one 14 mg patch per day   Weeks 7-8 Use one 7 mg patch per day       How to use the Nicotine Patch:    Apply a new patch to non-hairy, clean, dry skin on the upper body or upper outer arm.  Remove backing from patch and press on skin.  Hold for 10 seconds.    Apply patch around the same time every day.    Wash your hands after applying the patch.    Remove the patch at the end of the day before you go to bed.    Apply a new patch the next morning.    Do not apply the patch to an area where you have worn a patch in the last week.   This will help prevent or reduce skin irritation.    Some Tips:    Do not smoke while you are using the nicotine patch!    Do not cut the nicotine patch -it will be ineffective.    Remove the patch prior to receiving an MRI since the patch may contain some metal.    Do not put the patch on irritated, cut, or burned skin.    If the patch falls off and cannot be reapplied, put on a new patch.    Follow -up with your health care professional if you have any questions and also to help taper your nicotine patch dose.    Side Effects:  Some people experience some skin irritation where the patch was placed.  Moving around the site where you are putting the patch each day should help.  If you experience any other troublesome or unusual side effects, call your health  care professional.            Lung Cancer Screening   Frequently Asked Questions  If you are at high-risk for lung cancer, getting screened with low-dose computed tomography (LDCT) every year can help save your life. This handout offers answers to some of the most common questions about lung cancer screening. If you have other questions, please call [phone] ([phone]).     What is it?  Lung cancer screening uses special X-ray technology to create an image of your lung tissue. The exam is quick and easy and takes less than 10 seconds. We don t give you any medicine or use any needles. You can eat before and after the exam. You don t need to change your clothes as long as the clothing on your chest doesn t contain metal. But, you do need to be able to hold your breath for at least 6 seconds during the exam.    What is the goal of lung cancer screening?  The goal of lung cancer screening is to save lives. Many times, lung cancer is not found until a person starts having physical symptoms. Lung cancer screening can help detect lung cancer in the earliest stages when it may be easier to treat.    Who should be screened for lung cancer?  We suggest lung cancer screening for anyone who is at high-risk for lung cancer. You are in the high-risk group if you:      are between the ages of 55 and 79, and    have smoked at least 1 pack of cigarettes a day for 30 or more years, and    still smoke or have quit within the past 15 years.    However, if you have a new cough or shortness of breath, you should talk to your doctor before being screened.    Some national lung health advocacy groups also recommend screening for people ages 50 to 79 who have smoked an average of 1 pack of cigarettes a day for 20 years. They must also have at least 1 other risk factor for lung cancer, not including exposure to secondhand smoke. Other risk factors are having had cancer in the past, emphysema, pulmonary fibrosis, COPD, a  family history of lung cancer, or exposure to certain materials such as arsenic, asbestos, beryllium, cadmium, chromium, diesel fumes, nickel, radon or silica. Your care team can help you know if you have one of these risk factors.     Why does it matter if I have symptoms?  Certain symptoms can be a sign that you have a condition in your lungs that should be checked and treated by your doctor. These symptoms include fever, chest pain, a new or changing cough, shortness of breath that you have never felt before, coughing up blood or unexplained weight loss. Having any of these symptoms can greatly affect the results of lung cancer screening.       Should all smokers get an LDCT lung cancer screening exam?  It depends. Lung cancer screening is for a very specific group of men and women who have a history of heavy smoking over a long period of time (see  Who should be screened for lung cancer  above).  I am in the high-risk group, but have been diagnosed with cancer in the past. Is LDCT lung cancer screening right for me?  In some cases, you should not have LDCT lung screening, such as when your doctor is already following your cancer with CT scan studies. Your doctor will help you decide if LDCT lung screening is right for you.  Do I need to have a screening exam every year?  Yes. If you are in the high-risk group described earlier, you should get an LDCT lung cancer screening exam every year until you are 79, or are no longer willing or able to undergo screening and possible procedures to diagnose and treat lung cancer.  How effective is LDCT at preventing death from lung cancer?  Studies have shown that LDCT lung cancer screening can lower the risk of death from lung cancer by 20 percent in people who are at high-risk.  What are the risks?  There are some risks and limitations of LDCT lung cancer screening. We want to make sure you understand the risks and benefits, so please let us know if you have any questions.  Your doctor may want to talk with you more about these risks.    Radiation exposure: As with any exam that uses radiation, there is a very small increased risk of cancer. The amount of radiation in LDCT is small--about the same amount a person would get from a mammogram. Your doctor orders the exam when he or she feels the potential benefits outweigh the risks.    False negatives: No test is perfect, including LDCT. It is possible that you may have a medical condition, including lung cancer, that is not found during your exam. This is called a false negative result.    False positives and more testing: LDCT very often finds something in the lung that could be cancer, but in fact is not. This is called a false positive result. False positive tests often cause anxiety. To make sure these findings are not cancer, you may need to have more tests. These tests will be done only if you give us permission. Sometimes patients need a treatment that can have side effects, such as a biopsy. For more information on false positives, see  What can I expect from the results?     Findings not related to lung cancer: Your LDCT exam also takes pictures of areas of your body next to your lungs. In a very small number of cases, the CT scan will show an abnormal finding in one of these areas, such as your kidneys, adrenal glands, liver or thyroid. This finding may not be serious, but you may need more tests. Your doctor can help you decide what other tests you may need, if any.  What can I expect from the results?  About 1 out of 4 LDCT exams will find something that may need more tests. Most of the time, these findings are lung nodules. Lung nodules are very small collections of tissue in the lung. These nodules are very common, and the vast majority--more than 97 percent--are not cancer (benign). Most are normal lymph nodes or small areas of scarring from past infections.  But, if a small lung nodule is found to be cancer, the cancer  can be cured more than 90 percent of the time. To know if the nodule is cancer, we may need to get more images before your next yearly screening exam. If the nodule has suspicious features (for example, it is large, has an odd shape or grows over time), we will refer you to a specialist for further testing.  Will my doctor also get the results?  Yes. Your doctor will get a copy of your results.  Is it okay to keep smoking now that there s a cancer screening exam?  No. Tobacco is one of the strongest cancer-causing agents. It causes not only lung cancer, but other cancers and cardiovascular (heart) diseases as well. The damage caused by smoking builds over time. This means that the longer you smoke, the higher your risk of disease. While it is never too late to quit, the sooner you quit, the better.  Where can I find help to quit smoking?  The best way to prevent lung cancer is to stop smoking. If you have already quit smoking, congratulations and keep it up! For help on quitting smoking, please call [employer] at 4-322-852-JFSH ([phone]) or the American [employer] Society at 1-550.319.6593 to find local resources near you.  One-on-one health coaching:  If you d prefer to work individually with a health care provider on tobacco cessation, we offer:      Medication Therapy Management:  Our specially trained pharmacists work closely with you and your doctor to help you quit smoking.  Call [phone] or [phone] (toll free).     Can Do: Health coaching offered by [facility] [facility].  [email or web address]

## 2021-02-10 NOTE — PROGRESS NOTES
The patient was provided with suggestions to help him develop a healthy physical lifestyle.  The patient was counseled and encouraged to consider modifying their diet and eating habits. He was provided with information on recommended healthy diet options.  The patient was provided with written information regarding signs of hearing loss.  Lung Cancer Screening Shared Decision Making Visit     Chris Camarena is eligible for lung cancer screening on the basis of the information provided in my signed lung cancer screening order.     I have discussed with patient the risks and benefits of screening for lung cancer with low-dose CT.     The risks include:  radiation exposure: one low dose chest CT has as much ionizing radiation as about 15 chest x-rays or 6 months of background radiation living in [place]    false positives: 96% of positive findings/nodules are NOT cancer, but some might still require additional diagnostic evaluation, including biopsy  over-diagnosis: some slow growing cancers that might never have been clinically significant will be detected and treated unnecessarily     The benefit of early detection of lung cancer is contingent upon adherence to annual screening or more frequent follow up if indicated.     Furthermore, reaping the benefits of screening requires Chris Camarena to be willing and physically able to undergo diagnostic procedures, if indicated. Although no specific guide is available for determining severity of comorbidities, it is reasonable to withhold screening in patients who have greater mortality risk from other diseases.     We did discuss that the only way to prevent lung cancer is to not smoke. Smoking cessation counseling         ShouldIScreen

## 2021-02-11 LAB
ALBUMIN SERPL-MCNC: 3.8 G/DL (ref 3.4–5)
ALP SERPL-CCNC: 58 U/L (ref 40–150)
ALT SERPL W P-5'-P-CCNC: 30 U/L (ref 0–70)
ANION GAP SERPL CALCULATED.3IONS-SCNC: 3 MMOL/L (ref 3–14)
AST SERPL W P-5'-P-CCNC: 20 U/L (ref 0–45)
BILIRUB SERPL-MCNC: 0.7 MG/DL (ref 0.2–1.3)
BUN SERPL-MCNC: 12 MG/DL (ref 7–30)
CALCIUM SERPL-MCNC: 9.4 MG/DL (ref 8.5–10.1)
CHLORIDE SERPL-SCNC: 106 MMOL/L (ref 94–109)
CHOLEST SERPL-MCNC: 166 MG/DL
CO2 SERPL-SCNC: 27 MMOL/L (ref 20–32)
CREAT SERPL-MCNC: 0.93 MG/DL (ref 0.66–1.25)
GFR SERPL CREATININE-BSD FRML MDRD: 85 ML/MIN/{1.73_M2}
GLUCOSE SERPL-MCNC: 99 MG/DL (ref 70–99)
HDLC SERPL-MCNC: 57 MG/DL
LDLC SERPL CALC-MCNC: 87 MG/DL
NONHDLC SERPL-MCNC: 109 MG/DL
POTASSIUM SERPL-SCNC: 4.3 MMOL/L (ref 3.4–5.3)
PROT SERPL-MCNC: 7.1 G/DL (ref 6.8–8.8)
PSA SERPL-ACNC: 1.39 UG/L (ref 0–4)
SODIUM SERPL-SCNC: 136 MMOL/L (ref 133–144)
TRIGL SERPL-MCNC: 111 MG/DL

## 2021-03-06 ENCOUNTER — IMMUNIZATION (OUTPATIENT)
Dept: NURSING | Facility: CLINIC | Age: 66
End: 2021-03-06
Payer: COMMERCIAL

## 2021-03-06 PROCEDURE — 91303 PR COVID VAC JANSSEN AD26 0.5ML: CPT

## 2021-03-06 PROCEDURE — 0031A PR COVID VAC JANSSEN AD26 0.5ML: CPT

## 2021-03-23 ENCOUNTER — MYC MEDICAL ADVICE (OUTPATIENT)
Dept: PEDIATRICS | Facility: CLINIC | Age: 66
End: 2021-03-23

## 2021-03-23 DIAGNOSIS — H10.9 CONJUNCTIVITIS, UNSPECIFIED CONJUNCTIVITIS TYPE, UNSPECIFIED LATERALITY: Primary | ICD-10-CM

## 2021-03-23 RX ORDER — POLYMYXIN B SULFATE AND TRIMETHOPRIM 1; 10000 MG/ML; [USP'U]/ML
1-2 SOLUTION OPHTHALMIC EVERY 6 HOURS
Qty: 10 ML | Refills: 0 | Status: SHIPPED | OUTPATIENT
Start: 2021-03-23 | End: 2021-03-28

## 2021-04-10 ENCOUNTER — MEDICAL CORRESPONDENCE (OUTPATIENT)
Dept: HEALTH INFORMATION MANAGEMENT | Facility: CLINIC | Age: 66
End: 2021-04-10

## 2021-04-12 DIAGNOSIS — R00.2 PALPITATIONS: Primary | ICD-10-CM

## 2021-05-21 ENCOUNTER — TELEPHONE (OUTPATIENT)
Dept: PEDIATRICS | Facility: CLINIC | Age: 66
End: 2021-05-21

## 2021-08-01 ENCOUNTER — MYC MEDICAL ADVICE (OUTPATIENT)
Dept: PEDIATRICS | Facility: CLINIC | Age: 66
End: 2021-08-01

## 2021-08-02 ENCOUNTER — TRANSFERRED RECORDS (OUTPATIENT)
Dept: HEALTH INFORMATION MANAGEMENT | Facility: CLINIC | Age: 66
End: 2021-08-02

## 2021-08-02 ENCOUNTER — NURSE TRIAGE (OUTPATIENT)
Dept: PEDIATRICS | Facility: CLINIC | Age: 66
End: 2021-08-02

## 2021-08-02 NOTE — TELEPHONE ENCOUNTER
"Patient calling with abdominal pain that started during the night Saturday night.  Woke during night to go to bathroom and coughed and pain started.  Pain is entire abdomen.  Advised ER per protocol.  Patient declining ER.  Patient prefers UC.  Was advised UC may also send to ER for his severe abdominal pain.  Patient still declining ER.  RUTH Magaña Jeff W  to Kenrick Armenta MD JL    8/1/21 8:03 AM  I have had an Umbillical hernia for some time but it has not caused me any pain or discomfort but last night something changed. I woke up and while turning over I coughed and immediately felt a significant pain in my abdomen which continues. Ibuprofen helped with the pain but it is still uncomfortable and I know there are potentially serious developments that can occur with this issue. I do not have a fever or nausea or constipation, just pain and discomfort. Would like it checked out as soon as possible and can come in anytime on Monday 8/2 if possible.  Thank you.      Reason for Disposition    Constant abdominal pain lasting > 2 hours    Additional Information    Negative: Passed out (i.e., fainted, collapsed and was not responding)    Negative: Shock suspected (e.g., cold/pale/clammy skin, too weak to stand, low BP, rapid pulse)    Negative: Sounds like a life-threatening emergency to the triager    Negative: Chest pain    Negative: Pain is mainly in upper abdomen (if needed ask: 'is it mainly above the belly button?')    Negative: SEVERE abdominal pain (e.g., excruciating)    Negative: Vomiting red blood or black (coffee ground) material    Negative: Bloody, black, or tarry bowel movements (Exception: chronic-unchanged black-grey bowel movements and is taking iron pills or Pepto-bismol)    Negative: Unable to urinate (or only a few drops) and bladder feels very full    Negative: Pain in scrotum persists > 1 hour    Answer Assessment - Initial Assessment Questions  1. LOCATION: \"Where does " "it hurt?\"       Entire abdomen, does have umbilical hernia and focused in that area but all around as well  2. RADIATION: \"Does the pain shoot anywhere else?\" (e.g., chest, back)      no  3. ONSET: \"When did the pain begin?\" (Minutes, hours or days ago)       24 hours ago or more  4. SUDDEN: \"Gradual or sudden onset?\"      Woke Saturday night 3 am or so and coughed and pain was there  5. PATTERN \"Does the pain come and go, or is it constant?\"     - If constant: \"Is it getting better, staying the same, or worsening?\"       (Note: Constant means the pain never goes away completely; most serious pain is constant and it progresses)      - If intermittent: \"How long does it last?\" \"Do you have pain now?\"      (Note: Intermittent means the pain goes away completely between bouts)      constant  6. SEVERITY: \"How bad is the pain?\"  (e.g., Scale 1-10; mild, moderate, or severe)     - MILD (1-3): doesn't interfere with normal activities, abdomen soft and not tender to touch      - MODERATE (4-7): interferes with normal activities or awakens from sleep, tender to touch      - SEVERE (8-10): excruciating pain, doubled over, unable to do any normal activities        moderate  7. RECURRENT SYMPTOM: \"Have you ever had this type of abdominal pain before?\" If so, ask: \"When was the last time?\" and \"What happened that time?\"       no  8. CAUSE: \"What do you think is causing the abdominal pain?\"      Umbilical hernia  9. RELIEVING/AGGRAVATING FACTORS: \"What makes it better or worse?\" (e.g., movement, antacids, bowel movement)      Ibuprofen helps some  10. OTHER SYMPTOMS: \"Has there been any vomiting, diarrhea, constipation, or urine problems?\"        no    Protocols used: ABDOMINAL PAIN - MALE-A-OH    Lolita Schwartz RN    "

## 2021-10-03 ENCOUNTER — HEALTH MAINTENANCE LETTER (OUTPATIENT)
Age: 66
End: 2021-10-03

## 2022-03-20 ENCOUNTER — HEALTH MAINTENANCE LETTER (OUTPATIENT)
Age: 67
End: 2022-03-20

## 2022-07-27 ENCOUNTER — OFFICE VISIT (OUTPATIENT)
Dept: URGENT CARE | Facility: URGENT CARE | Age: 67
End: 2022-07-27
Payer: COMMERCIAL

## 2022-07-27 VITALS
TEMPERATURE: 99.3 F | HEART RATE: 83 BPM | DIASTOLIC BLOOD PRESSURE: 81 MMHG | SYSTOLIC BLOOD PRESSURE: 151 MMHG | OXYGEN SATURATION: 96 %

## 2022-07-27 DIAGNOSIS — R21 RASH AND NONSPECIFIC SKIN ERUPTION: Primary | ICD-10-CM

## 2022-07-27 PROCEDURE — 99203 OFFICE O/P NEW LOW 30 MIN: CPT | Performed by: PHYSICIAN ASSISTANT

## 2022-07-27 RX ORDER — SULFAMETHOXAZOLE/TRIMETHOPRIM 800-160 MG
1 TABLET ORAL 2 TIMES DAILY
Qty: 14 TABLET | Refills: 0 | Status: SHIPPED | OUTPATIENT
Start: 2022-07-27 | End: 2022-08-03

## 2022-07-27 RX ORDER — VALACYCLOVIR HYDROCHLORIDE 1 G/1
1000 TABLET, FILM COATED ORAL 3 TIMES DAILY
Qty: 21 TABLET | Refills: 0 | Status: SHIPPED | OUTPATIENT
Start: 2022-07-27 | End: 2022-08-03

## 2022-07-27 NOTE — PATIENT INSTRUCTIONS
Start taking antiviral and antibiotic for rash  TO ER if having fever, chills, worsening symptoms, increased redness or swelling

## 2022-07-28 NOTE — PROGRESS NOTES
Assessment & Plan     1. Rash and nonspecific skin eruption  Initially, rash consistent with shingles as it is vesicular with crusting. NOW has increased redness and swelling concerning for cellulitis  No oral lesions, or lesions on soles or palms.  Will treat with medication as below  Encouraged rest and warm compresses  Tylenol for pain  Discussed indications for follow-up including fever,chills, worsening redness or swelling  - valACYclovir (VALTREX) 1000 mg tablet; Take 1 tablet (1,000 mg) by mouth 3 times daily for 7 days  Dispense: 21 tablet; Refill: 0  - sulfamethoxazole-trimethoprim (BACTRIM DS) 800-160 MG tablet; Take 1 tablet by mouth 2 times daily for 7 days  Dispense: 14 tablet; Refill: 0        No follow-ups on file.    Diagnosis and treatment plan was reviewed with patient and/or family.   We went over any labs or imaging. Discussed worsening symptoms or little to no relief despite treatment plan to follow-up with PCP or return to clinic.  Patient verbalizes understanding. All questions were addressed and answered.     Sonali Nunez PA-C  [facility] [place]    CHIEF COMPLAINT:   Chief Complaint   Patient presents with     Rash     RASH WITH BLISTERS ON HIS LEFT LEG     Subjective     Enoch is a 67 year old male who presents to clinic today for evaluation of rash. First noticed the rash several days ago. Started as a vesicular slightly itchy rash and has now had surrounding erythema. No fever or chills.        Past Medical History:   Diagnosis Date     Elevated blood pressure reading without diagnosis of hypertension 7/22/2016     Elevated blood sugar 7/25/2016     Palpitations 7/22/2016     Sleep apnea 20 years ago     had study [facility] of [facility]      Tobacco use      No past surgical history on file.  Social History     Tobacco Use     Smoking status: Current Every Day Smoker     Packs/day: 1.00     Years: 40.00     Pack years: 40.00     Types: Cigarettes     Smokeless tobacco:  Never Used   Substance Use Topics     Alcohol use: Yes     Alcohol/week: 0.8 standard drinks     Types: 1 Cans of beer per week     Comment: daily- 1 beer a day      Current Outpatient Medications   Medication     sulfamethoxazole-trimethoprim (BACTRIM DS) 800-160 MG tablet     valACYclovir (VALTREX) 1000 mg tablet     ibuprofen (ADVIL,MOTRIN) 600 MG tablet     nicotine (NICODERM CQ) 14 MG/24HR 24 hr patch     nicotine (NICODERM CQ) 21 MG/24HR 24 hr patch     nicotine (NICODERM CQ) 7 MG/24HR 24 hr patch     No current facility-administered medications for this visit.     No Known Allergies    10 point ROS of systems were all negative except for pertinent positives noted in my HPI.      Exam:   BP (!) 151/81   Pulse 83   Temp 99.3  F (37.4  C)   SpO2 96%   Constitutional: healthy, alert and no distress  Head: Normocephalic, atraumatic.  Skin: Multiple vesicles with crusting. TTP. Surrounding erythema without swelling or TTP.   Neurologic: Speech clear, gait normal. Moves all extremities.

## 2022-08-02 ENCOUNTER — OFFICE VISIT (OUTPATIENT)
Dept: PEDIATRICS | Facility: CLINIC | Age: 67
End: 2022-08-02
Payer: COMMERCIAL

## 2022-08-02 VITALS
SYSTOLIC BLOOD PRESSURE: 156 MMHG | HEART RATE: 96 BPM | WEIGHT: 240 LBS | TEMPERATURE: 101 F | OXYGEN SATURATION: 95 % | HEIGHT: 69 IN | BODY MASS INDEX: 35.55 KG/M2 | RESPIRATION RATE: 28 BRPM | DIASTOLIC BLOOD PRESSURE: 68 MMHG

## 2022-08-02 DIAGNOSIS — R50.9 FEVER, UNSPECIFIED FEVER CAUSE: ICD-10-CM

## 2022-08-02 DIAGNOSIS — L03.116 CELLULITIS OF LEFT LOWER EXTREMITY: Primary | ICD-10-CM

## 2022-08-02 PROCEDURE — 99213 OFFICE O/P EST LOW 20 MIN: CPT | Performed by: PHYSICIAN ASSISTANT

## 2022-08-02 ASSESSMENT — PAIN SCALES - GENERAL: PAINLEVEL: NO PAIN (0)

## 2022-08-02 NOTE — PROGRESS NOTES
"  Assessment & Plan     Cellulitis of left lower extremity  Accompanied by fevers. Patient referred to ED for further eval and management including labs and IV antibiotics.     Fever, unspecified fever cause    XOCHITL Healy [facility] [place] Kendall is a 67 year old  presenting for the following health issues:  UC Follow-Up      [facility]     ED/UC Followup:     Facility:  [facility]  Date of visit: 07/27/202  Reason for visit: rash--patient placed on antivirals and bactrim for secondary cellulitis.   Current Status: still taking bactrim. Tolerating.   Fevers persist and Tmax 101 today. Tylenol reduces fevers.   Redness of leg same or worse. Unsure.     No cough or URI symptoms. No GI symptoms or  symptoms. No chest pain, tightness or sob.     Review of Systems   Constitutional, HEENT, cardiovascular, pulmonary, gi and gu systems are negative, except as otherwise noted.      Objective    BP (!) 156/68   Pulse 96   Temp (!) 101  F (38.3  C) (Oral)   Resp 28   Ht 1.74 m (5' 8.5\")   Wt 108.9 kg (240 lb)   SpO2 95%   BMI 35.96 kg/m    Body mass index is 35.96 kg/m .  Physical Exam   GENERAL: flushed, alert and no distress  EYES: Eyes grossly normal to inspection, PERRL and conjunctivae and sclerae normal  SKIN: inspection of the left lower extremity, there is diffuse erythema, edema and warmth to palpation. No streaking.   HEART: no murmur. Tachy rate and reg rhythm  LUNGS: CTA bilaterally  "

## 2022-09-10 ENCOUNTER — HEALTH MAINTENANCE LETTER (OUTPATIENT)
Age: 67
End: 2022-09-10

## 2022-12-13 ENCOUNTER — TELEPHONE (OUTPATIENT)
Dept: PEDIATRICS | Facility: CLINIC | Age: 67
End: 2022-12-13

## 2022-12-13 NOTE — TELEPHONE ENCOUNTER
Called patient to check-in and schedule physical, no answer. LVM requesting a call back directly to PAL extension.     Maira Dhillon, [facility]  [phone]

## 2023-01-12 ENCOUNTER — MYC MEDICAL ADVICE (OUTPATIENT)
Dept: PEDIATRICS | Facility: CLINIC | Age: 68
End: 2023-01-12

## 2023-04-30 ENCOUNTER — HEALTH MAINTENANCE LETTER (OUTPATIENT)
Age: 68
End: 2023-04-30

## 2023-09-06 ENCOUNTER — MYC MEDICAL ADVICE (OUTPATIENT)
Dept: PEDIATRICS | Facility: CLINIC | Age: 68
End: 2023-09-06
Payer: COMMERCIAL

## 2023-12-07 ENCOUNTER — MYC MEDICAL ADVICE (OUTPATIENT)
Dept: PEDIATRICS | Facility: CLINIC | Age: 68
End: 2023-12-07
Payer: COMMERCIAL

## 2024-07-07 ENCOUNTER — HEALTH MAINTENANCE LETTER (OUTPATIENT)
Age: 69
End: 2024-07-07

## 2025-06-12 ENCOUNTER — OFFICE VISIT (OUTPATIENT)
Dept: FAMILY MEDICINE | Facility: CLINIC | Age: 70
End: 2025-06-12
Payer: COMMERCIAL

## 2025-06-12 VITALS
WEIGHT: 243 LBS | RESPIRATION RATE: 18 BRPM | SYSTOLIC BLOOD PRESSURE: 162 MMHG | HEIGHT: 69 IN | HEART RATE: 63 BPM | BODY MASS INDEX: 35.99 KG/M2 | TEMPERATURE: 98.7 F | OXYGEN SATURATION: 94 % | DIASTOLIC BLOOD PRESSURE: 70 MMHG

## 2025-06-12 DIAGNOSIS — K42.9 UMBILICAL HERNIA WITHOUT OBSTRUCTION AND WITHOUT GANGRENE: Primary | ICD-10-CM

## 2025-06-12 DIAGNOSIS — R03.0 ELEVATED BLOOD PRESSURE READING WITHOUT DIAGNOSIS OF HYPERTENSION: ICD-10-CM

## 2025-06-12 DIAGNOSIS — R01.1 HEART MURMUR: ICD-10-CM

## 2025-06-12 LAB
ANION GAP SERPL CALCULATED.3IONS-SCNC: 10 MMOL/L (ref 7–15)
BUN SERPL-MCNC: 12.4 MG/DL (ref 8–23)
CALCIUM SERPL-MCNC: 9.5 MG/DL (ref 8.8–10.4)
CHLORIDE SERPL-SCNC: 100 MMOL/L (ref 98–107)
CREAT SERPL-MCNC: 0.94 MG/DL (ref 0.67–1.17)
EGFRCR SERPLBLD CKD-EPI 2021: 88 ML/MIN/1.73M2
GLUCOSE SERPL-MCNC: 102 MG/DL (ref 70–99)
HCO3 SERPL-SCNC: 25 MMOL/L (ref 22–29)
POTASSIUM SERPL-SCNC: 4.6 MMOL/L (ref 3.4–5.3)
SODIUM SERPL-SCNC: 135 MMOL/L (ref 135–145)

## 2025-06-12 RX ORDER — LOSARTAN POTASSIUM 25 MG/1
25 TABLET ORAL DAILY
Qty: 60 TABLET | Refills: 0 | Status: SHIPPED | OUTPATIENT
Start: 2025-06-12 | End: 2025-08-11

## 2025-06-12 NOTE — PATIENT INSTRUCTIONS
General surgery consult for hernia     Echocardiogram for heart murmur    Start losartan for BP  -goal is <130/80  -buy OMRON blood pressure cuff to monitor.  -labs today  -follow up in 1 month

## 2025-06-12 NOTE — PROGRESS NOTES
Assessment & Plan     (K42.9) Umbilical hernia without obstruction and without gangrene  (primary encounter diagnosis)  Comment: History of umbilical hernia.  Per patient has been growing in size over the last month.  Has been noting intermittent abdominal discomfort with this as well.  Recommend being sent to general surgery for evaluation and potential hernia repair.  Referral placed. Patient fully understands and is agreeable with plan of care, at this point patient will follow up as needed unless acute concerns arise in the meantime.  Plan: Adult Gen Surg  Referral, PRIMARY CARE        FOLLOW-UP SCHEDULING    (R01.1) Heart murmur  Comment: Noted on exam today.  Does not look to be that this has been documented in the past, new finding.  Will get updated echocardiogram to ensure stable.  If any concerns arise from results we will send to cardiology. Patient fully understands and is agreeable with plan of care, at this point patient will follow up as needed unless acute concerns arise in the meantime.  Plan: Echocardiogram Complete with [facility], PRIMARY         CARE FOLLOW-UP SCHEDULING    (R03.0) Elevated blood pressure reading without diagnosis of hypertension  Comment: Elevated today.  Opting to start patient on losartan today.  Will get updated BMP to ensure stable.  Discussed importance of monitoring blood pressure at home, does not check at home currently. Recommend buying BP cuff and start monitoring.  Goal BP <130/80.  Continue to monitor at home and record.   Discussed smoking being contributor of elevated BP.  Will follow-up on lab results and whether further direction is necessary. Follow-up at upcoming visit to establish care to reevaluate and discuss further if necessary. Patient fully understands and is agreeable with plan of care, at this point patient will follow up as needed unless acute concerns arise in the meantime.  Plan: losartan (COZAAR) 25 MG tablet, Basic metabolic        panel   "(Ca, Cl, CO2, Creat, Gluc, K, Na, BUN)      35 minutes spent by me on the date of the encounter doing chart review, history and exam, documentation and further activities per the note    Nicotine/Tobacco Cessation  He reports that he has been smoking cigarettes. He has a 40 pack-year smoking history. He has never used smokeless tobacco.  Nicotine/Tobacco Cessation Plan  Information offered: Patient not interested at this time    BMI  Estimated body mass index is 35.88 kg/m  as calculated from the following:    Height as of this encounter: 1.753 m (5' 9\").    Weight as of this encounter: 110.2 kg (243 lb).   Weight management plan: Discussed healthy diet and exercise guidelines      Follow-up   No follow-ups on file.     Follow-up Visit   Expected date:  Jun 19, 2025 (Approximate)      Follow Up Appointment Details:     Follow-up with whom?: My Department    Follow-Up for what?: Adult Preventive    How?: In Person                 Subjective   Enoch is a 70 year old, presenting for the following health issues:  Hernia (Sx worse, little pain, discolored, and seems to be pertruding more)        6/12/2025     8:19 AM   Additional Questions   Roomed by Shantel BULLOCK     History of Present Illness       Reason for visit:  Severe growth of umbilical hernia    He eats 2-3 servings of fruits and vegetables daily.He consumes 1 sweetened beverage(s) daily.He exercises with enough effort to increase his heart rate 10 to 19 minutes per day.  He exercises with enough effort to increase his heart rate 4 days per week.   He is taking medications regularly.        Review of Systems  Constitutional, HEENT, cardiovascular, pulmonary, gi and gu systems are negative, except as otherwise noted.      Objective    There were no vitals taken for this visit.  There is no height or weight on file to calculate BMI.  Physical Exam  Vitals and nursing note reviewed.   Constitutional:       General: He is not in acute distress.     Appearance: Normal " appearance. He is not ill-appearing.   Cardiovascular:      Rate and Rhythm: Normal rate and regular rhythm.      Heart sounds: Murmur heard.      No friction rub. No gallop.   Pulmonary:      Effort: Pulmonary effort is normal. No respiratory distress.      Breath sounds: No wheezing, rhonchi or rales.   Abdominal:      General: Bowel sounds are normal. There is no distension.      Palpations: Abdomen is soft.      Tenderness: There is no abdominal tenderness. There is no guarding.      Hernia: A hernia is present.   Skin:     General: Skin is warm and dry.   Neurological:      Mental Status: He is alert.   Psychiatric:         Mood and Affect: Mood normal.         Behavior: Behavior normal. Behavior is cooperative.         Thought Content: Thought content normal.         Judgment: Judgment normal.        Signed Electronically by: SUE Emery CNP

## 2025-06-13 ENCOUNTER — RESULTS FOLLOW-UP (OUTPATIENT)
Dept: FAMILY MEDICINE | Facility: CLINIC | Age: 70
End: 2025-06-13

## 2025-07-13 SDOH — HEALTH STABILITY: PHYSICAL HEALTH: ON AVERAGE, HOW MANY MINUTES DO YOU ENGAGE IN EXERCISE AT THIS LEVEL?: 40 MIN

## 2025-07-13 SDOH — HEALTH STABILITY: PHYSICAL HEALTH: ON AVERAGE, HOW MANY DAYS PER WEEK DO YOU ENGAGE IN MODERATE TO STRENUOUS EXERCISE (LIKE A BRISK WALK)?: 4 DAYS

## 2025-07-13 ASSESSMENT — SOCIAL DETERMINANTS OF HEALTH (SDOH): HOW OFTEN DO YOU GET TOGETHER WITH FRIENDS OR RELATIVES?: ONCE A WEEK

## 2025-07-14 ENCOUNTER — OFFICE VISIT (OUTPATIENT)
Dept: FAMILY MEDICINE | Facility: CLINIC | Age: 70
End: 2025-07-14
Payer: COMMERCIAL

## 2025-07-14 ENCOUNTER — MYC MEDICAL ADVICE (OUTPATIENT)
Dept: FAMILY MEDICINE | Facility: CLINIC | Age: 70
End: 2025-07-14

## 2025-07-14 VITALS
HEART RATE: 77 BPM | BODY MASS INDEX: 36.48 KG/M2 | RESPIRATION RATE: 18 BRPM | DIASTOLIC BLOOD PRESSURE: 80 MMHG | SYSTOLIC BLOOD PRESSURE: 152 MMHG | HEIGHT: 69 IN | TEMPERATURE: 98.7 F | WEIGHT: 246.3 LBS | OXYGEN SATURATION: 99 %

## 2025-07-14 DIAGNOSIS — Z72.0 TOBACCO USE: ICD-10-CM

## 2025-07-14 DIAGNOSIS — R73.9 ELEVATED BLOOD SUGAR: ICD-10-CM

## 2025-07-14 DIAGNOSIS — Z12.11 SCREEN FOR COLON CANCER: ICD-10-CM

## 2025-07-14 DIAGNOSIS — E78.5 HYPERLIPIDEMIA LDL GOAL <130: ICD-10-CM

## 2025-07-14 DIAGNOSIS — Z13.6 SCREENING FOR AAA (ABDOMINAL AORTIC ANEURYSM): ICD-10-CM

## 2025-07-14 DIAGNOSIS — Z13.6 SCREENING FOR CARDIOVASCULAR CONDITION: ICD-10-CM

## 2025-07-14 DIAGNOSIS — Z00.00 ENCOUNTER FOR MEDICARE ANNUAL WELLNESS EXAM: Primary | ICD-10-CM

## 2025-07-14 DIAGNOSIS — Z12.5 SCREENING FOR PROSTATE CANCER: ICD-10-CM

## 2025-07-14 DIAGNOSIS — Z12.2 SCREENING FOR LUNG CANCER: ICD-10-CM

## 2025-07-14 DIAGNOSIS — Z13.6 CARDIOVASCULAR SCREENING; LDL GOAL LESS THAN 130: ICD-10-CM

## 2025-07-14 DIAGNOSIS — I10 BENIGN ESSENTIAL HYPERTENSION: ICD-10-CM

## 2025-07-14 DIAGNOSIS — D22.9 CHANGE IN SKIN MOLE: ICD-10-CM

## 2025-07-14 DIAGNOSIS — Z87.891 PERSONAL HISTORY OF NICOTINE DEPENDENCE: ICD-10-CM

## 2025-07-14 DIAGNOSIS — E66.812 CLASS 2 SEVERE OBESITY DUE TO EXCESS CALORIES WITH SERIOUS COMORBIDITY AND BODY MASS INDEX (BMI) OF 36.0 TO 36.9 IN ADULT (H): ICD-10-CM

## 2025-07-14 DIAGNOSIS — G47.33 OSA (OBSTRUCTIVE SLEEP APNEA): ICD-10-CM

## 2025-07-14 DIAGNOSIS — R21 RASH AND NONSPECIFIC SKIN ERUPTION: ICD-10-CM

## 2025-07-14 DIAGNOSIS — R06.83 SNORING: ICD-10-CM

## 2025-07-14 DIAGNOSIS — E66.01 CLASS 2 SEVERE OBESITY DUE TO EXCESS CALORIES WITH SERIOUS COMORBIDITY AND BODY MASS INDEX (BMI) OF 36.0 TO 36.9 IN ADULT (H): ICD-10-CM

## 2025-07-14 LAB
BASOPHILS # BLD AUTO: 0.1 10E3/UL (ref 0–0.2)
BASOPHILS NFR BLD AUTO: 1 %
EOSINOPHIL # BLD AUTO: 0.2 10E3/UL (ref 0–0.7)
EOSINOPHIL NFR BLD AUTO: 2 %
ERYTHROCYTE [DISTWIDTH] IN BLOOD BY AUTOMATED COUNT: 11.3 % (ref 10–15)
EST. AVERAGE GLUCOSE BLD GHB EST-MCNC: 108 MG/DL
HBA1C MFR BLD: 5.4 % (ref 0–5.6)
HCT VFR BLD AUTO: 45.6 % (ref 40–53)
HGB BLD-MCNC: 15.4 G/DL (ref 13.3–17.7)
IMM GRANULOCYTES # BLD: 0 10E3/UL
IMM GRANULOCYTES NFR BLD: 0 %
LYMPHOCYTES # BLD AUTO: 1.4 10E3/UL (ref 0.8–5.3)
LYMPHOCYTES NFR BLD AUTO: 20 %
MCH RBC QN AUTO: 32.5 PG (ref 26.5–33)
MCHC RBC AUTO-ENTMCNC: 33.8 G/DL (ref 31.5–36.5)
MCV RBC AUTO: 96 FL (ref 78–100)
MONOCYTES # BLD AUTO: 0.6 10E3/UL (ref 0–1.3)
MONOCYTES NFR BLD AUTO: 9 %
NEUTROPHILS # BLD AUTO: 4.8 10E3/UL (ref 1.6–8.3)
NEUTROPHILS NFR BLD AUTO: 68 %
PLATELET # BLD AUTO: 200 10E3/UL (ref 150–450)
RBC # BLD AUTO: 4.74 10E6/UL (ref 4.4–5.9)
WBC # BLD AUTO: 7.1 10E3/UL (ref 4–11)

## 2025-07-14 PROCEDURE — 80053 COMPREHEN METABOLIC PANEL: CPT | Performed by: NURSE PRACTITIONER

## 2025-07-14 PROCEDURE — 85025 COMPLETE CBC W/AUTO DIFF WBC: CPT | Performed by: NURSE PRACTITIONER

## 2025-07-14 PROCEDURE — 84443 ASSAY THYROID STIM HORMONE: CPT | Performed by: NURSE PRACTITIONER

## 2025-07-14 PROCEDURE — 83036 HEMOGLOBIN GLYCOSYLATED A1C: CPT | Performed by: NURSE PRACTITIONER

## 2025-07-14 PROCEDURE — 80061 LIPID PANEL: CPT | Performed by: NURSE PRACTITIONER

## 2025-07-14 PROCEDURE — 36415 COLL VENOUS BLD VENIPUNCTURE: CPT | Performed by: NURSE PRACTITIONER

## 2025-07-14 PROCEDURE — G0103 PSA SCREENING: HCPCS | Performed by: NURSE PRACTITIONER

## 2025-07-14 NOTE — PATIENT INSTRUCTIONS
Take your meds as prescribed.    Scripts / refills sent to your pharmacy.     See Dermatology.      See Sleep Clinic.    Chest CT, Abdominal Ultrasound ordered.     Colonoscopy and Fit test ordered.     PCV20 shot at pharmacy.     Think about RSV shot as well.      Increase water (48-64 ounces per day), fruits, and vegetables.    Exercise at least 5 days per week for 30 minutes each day as tolerated.      Follow-up in 1 month or sooner as needed.      Call or MyChart as needed as well.          Patient Education   Preventive Care Advice   This is general advice given by our system to help you stay healthy. However, your care team may have specific advice just for you. Please talk to your care team about your preventive care needs.  Nutrition  Eat 5 or more servings of fruits and vegetables each day.  Try wheat bread, brown rice and whole grain pasta (instead of white bread, rice, and pasta).  Get enough calcium and vitamin D. Check the label on foods and aim for 100% of the RDA (recommended daily allowance).  Lifestyle  Exercise at least 150 minutes each week  (30 minutes a day, 5 days a week).  Do muscle strengthening activities 2 days a week. These help control your weight and prevent disease.  No smoking.  Wear sunscreen to prevent skin cancer.  Have a dental exam and cleaning every 6 months.  Yearly exams  See your health care team every year to talk about:  Any changes in your health.  Any medicines your care team has prescribed.  Preventive care, family planning, and ways to prevent chronic diseases.  Shots (vaccines)   HPV shots (up to age 26), if you've never had them before.  Hepatitis B shots (up to age 59), if you've never had them before.  COVID-19 shot: Get this shot when it's due.  Flu shot: Get a flu shot every year.  Tetanus shot: Get a tetanus shot every 10 years.  Pneumococcal, hepatitis A, and RSV shots: Ask your care team if you need these based on your risk.  Shingles shot (for age 50 and  up)  General health tests  Diabetes screening:  Starting at age 35, Get screened for diabetes at least every 3 years.  If you are younger than age 35, ask your care team if you should be screened for diabetes.  Cholesterol test: At age 39, start having a cholesterol test every 5 years, or more often if advised.  Bone density scan (DEXA): At age 50, ask your care team if you should have this scan for osteoporosis (brittle bones).  Hepatitis C: Get tested at least once in your life.  STIs (sexually transmitted infections)  Before age 24: Ask your care team if you should be screened for STIs.  After age 24: Get screened for STIs if you're at risk. You are at risk for STIs (including HIV) if:  You are sexually active with more than one person.  You don't use condoms every time.  You or a partner was diagnosed with a sexually transmitted infection.  If you are at risk for HIV, ask about PrEP medicine to prevent HIV.  Get tested for HIV at least once in your life, whether you are at risk for HIV or not.  Cancer screening tests  Cervical cancer screening: If you have a cervix, begin getting regular cervical cancer screening tests starting at age 21.  Breast cancer scan (mammogram): If you've ever had breasts, begin having regular mammograms starting at age 40. This is a scan to check for breast cancer.  Colon cancer screening: It is important to start screening for colon cancer at age 45.  Have a colonoscopy test every 10 years (or more often if you're at risk) Or, ask your provider about stool tests like a FIT test every year or Cologuard test every 3 years.  To learn more about your testing options, visit:   .  For help making a decision, visit:   https://bit.ly/qd08344.  Prostate cancer screening test: If you have a prostate, ask your care team if a prostate cancer screening test (PSA) at age 55 is right for you.  Lung cancer screening: If you are a current or former smoker ages 50 to 80, ask your care team if ongoing  "lung cancer screenings are right for you.  For informational purposes only. Not to replace the advice of your health care provider. Copyright   2023 [facility]. All rights reserved. Clinically reviewed by the [facility]. [employer] [id number] - REV 01/24.  Eating Healthy Foods: Care Instructions  With every meal, you can make healthy food choices. Try to eat a variety of fruits, vegetables, whole grains, lean proteins, and low-fat dairy products. This can help you get the right balance of nutrients, including vitamins and minerals. Small changes add up over time. You can start by adding one healthy food to your meals each day.    Try to make half your plate fruits and vegetables, one-fourth whole grains, and one-fourth lean proteins. Try including dairy with your meals.   Eat more fruits and vegetables. Try to have them with most meals and snacks.   Foods for healthy eating        Fruits   These can be fresh, frozen, canned, or dried.  Try to choose whole fruit rather than fruit juice.  Eat a variety of colors.        Vegetables   These can be fresh, frozen, canned, or dried.  Beans, peas, and lentils count too.        Whole grains   Choose whole-grain breads, cereals, and noodles.  Try brown rice.        Lean proteins   These can include lean meat, poultry, fish, and eggs.  You can also have tofu, beans, peas, lentils, nuts, and seeds.        Dairy   Try milk, yogurt, and cheese.  Choose low-fat or fat-free when you can.  If you need to, use lactose-free milk or fortified plant-based milk products, such as soy milk.        Water   Drink water when you're thirsty.  Limit sugar-sweetened drinks, including soda, fruit drinks, and sports drinks.  Where can you learn more?  Go to https://www.healthwise.net/patiented  Enter T756 in the search box to learn more about \"Eating Healthy Foods: Care Instructions.\"  Current as of: October 7, 2024  Content Version: 14.5 2024-2025 [employer] " [employer].   Care instructions adapted under license by your healthcare professional. If you have questions about a medical condition or this instruction, always ask your healthcare professional. [facility] [employer] disclaims any warranty or liability for your use of this information.    Nutrition for Older Adults: Care Instructions  Overview     Good nutrition is important at any age. But it is especially important for older adults. Eating healthy foods helps keep your body strong. And it can help lower your risk for disease.  As you get older, your body needs more of certain nutrients. These include vitamin B12, calcium, and vitamin D. But it may be harder for you to get these and other important nutrients. This could be for many reasons. You may not feel as hungry as you used to. Or you could have problems with your teeth or mouth that make it hard to chew. Or you may not enjoy planning and preparing meals, especially if you live alone.  Talk with your doctor if you want help getting the most nutrition from what you eat. They may have you work with a dietitian to help you plan meals.  Follow-up care is a key part of your treatment and safety. Be sure to make and go to all appointments, and call your doctor if you are having problems. It's also a good idea to know your test results and keep a list of the medicines you take.  How can you care for yourself at home?  To stay healthy  Eat a variety of foods. The more you vary the foods you eat, the more vitamins, minerals, and other nutrients you get.  Ask your doctor if you should take a multivitamin. Choose one with about 100% of the daily value (DV) for vitamins and minerals. Do not take more than 100% of the daily value for any vitamin or mineral unless your doctor tells you to. Talk with your doctor if you are not sure which multivitamin is right for you.  Try to eat lots of fruits and vegetables. Fresh or frozen vegetables and fruits are healthy  choices. Choose canned vegetables that have no salt added and fruits that are canned in their own juice or light syrup.  Include foods that are high in vitamin B12 in your diet. Good choices are fortified breakfast cereal, nonfat or low-fat milk and other dairy products, meat, poultry, fish, and eggs.  Get enough calcium and vitamin D. Good choices include nonfat or low-fat milk, cheese, and yogurt. Other good options are tofu, orange juice with added calcium, and some leafy green vegetables, such as ra greens and kale. If you don't use milk products, talk to your doctor about calcium and vitamin D supplements.  Try to eat protein foods every day. Good choices include lean meat, fish, poultry, eggs, and cheese. Other good options are cooked beans, peanut butter, and nuts and seeds.  Choose whole grains for half of the grains you eat. Look for 100% whole wheat bread, whole-grain cereals, brown rice, and other whole grains.  If you have constipation  Eat high-fiber foods every day if you can. These include fruits, vegetables, cooked dried beans, and whole grains.  Drink plenty of fluids. If you have kidney, heart, or liver disease and have to limit fluids, talk with your doctor before you increase the amount of fluids you drink.  Ask your doctor if stool softeners may help keep your bowels regular.  If you have mouth problems that make chewing hard  Pick canned or cooked fruits and vegetables. These are often softer.  Chop or shred meat, poultry, and fish. Add sauce or gravy to the meat to help keep it moist.  Pick other protein foods that are soft. These include cheese, peanut butter, cooked beans, cottage cheese, and eggs.  If you have trouble shopping for yourself  Ask a local food store to deliver groceries to your home.  Contact your local area agency on aging and ask about resources that can help.  Ask a family member or neighbor to help you.  If you have trouble preparing meals  Try easier cooking methods  "such as using a slow cooker or microwave oven.  Let the grocery store do some of the work for you. Look for precut, washed, and ready-to-eat foods.  Take part in group meal programs. You can find these through senior citizen programs.  Have meals brought to your home. Your community may offer programs that deliver meals, such as Meals on Wheels. Or you could use an online meal delivery service.  If you are able, take a cooking class.  If your appetite is poor  Try to eat meals on a regular schedule. It may help to eat smaller meals more often throughout the day.  If you can, eat some meals with other people. You could ask family or friends to eat with you. Or you could take part in group meal programs offered in your community.  Ask your doctor if your medicines could cause appetite or taste problems. If so, ask about changing medicines.  Add spices and herbs to increase the flavor of food.  If you think you are depressed, ask your doctor for help. Depression can affect your appetite. And it can make it hard to do everyday activities like grocery shopping and making meals. Treatment can help.  When should you call for help?  Watch closely for changes in your health, and be sure to contact your doctor if you have any problems.  Where can you learn more?  Go to https://www.[employer].net/patiented  Enter L643 in the search box to learn more about \"Nutrition for Older Adults: Care Instructions.\"  Current as of: October 25, 2024  Content Version: 14.5 2024-2025 [employer].   Care instructions adapted under license by your healthcare professional. If you have questions about a medical condition or this instruction, always ask your healthcare professional. [employer] disclaims any warranty or liability for your use of this information.    Learning About Activities of Daily Living  What are activities of daily living?     Activities of daily living (ADLs) are the basic self-care tasks you do " every day. These include eating, bathing, dressing, and moving around.  As you age, and if you have health problems, you may find that it's harder to do some of these tasks. If so, your doctor can suggest ideas that may help.  To measure what kind of help you may need, your doctor will ask how well you are able to do ADLs. Let your doctor know if there are any tasks that you are having trouble doing. This is an important first step to getting help. And when you have the help you need, you can stay as independent as possible.  How will a doctor assess your ADLs?  Asking about ADLs is part of a routine health checkup your doctor will likely do as you age. Your health check might be done in a doctor's office, in your home, or at a hospital. The goal is to find out if you are having any problems that could make it hard to care for yourself or that make it unsafe for you to be on your own.  To measure your ADLs, your doctor will ask how hard it is for you to do routine tasks. Your doctor may also want to know if you have changed the way you do a task because of a health problem. Your doctor may watch how you:  Walk back and forth.  Keep your balance while you stand or walk.  Move from sitting to standing or from a bed to a chair.  Button or unbutton a shirt or sweater.  Remove and put on your shoes.  It's common to feel a little worried or anxious if you find you can't do all the things you used to be able to do. Talking with your doctor about ADLs is a way to make sure you're as safe as possible and able to care for yourself as well as you can. You may want to bring a caregiver, friend, or family member to your checkup. They can help you talk to your doctor.  Follow-up care is a key part of your treatment and safety. Be sure to make and go to all appointments, and call your doctor if you are having problems. It's also a good idea to know your test results and keep a list of the medicines you take.  Current as of:  October 24, 2024  Content Version: 14.5    6727-5856 [employer].   Care instructions adapted under license by your healthcare professional. If you have questions about a medical condition or this instruction, always ask your healthcare professional. [employer] disclaims any warranty or liability for your use of this information.    Preventing Falls: Care Instructions  Injuries and health problems such as trouble walking or poor eyesight can increase your risk of falling. So can some medicines. But there are things you can do to help prevent falls. You can exercise to get stronger. You can also arrange your home to make it safer.    Talk to your doctor about the medicines you take. Ask if any of them increase the risk of falls and whether they can be changed or stopped.   Try to exercise regularly. It can help improve your strength and balance. This can help lower your risk of falling.         Practice fall safety and prevention.   Wear low-heeled shoes that fit well and give your feet good support. Talk to your doctor if you have foot problems that make this hard.  Carry a cellphone or wear a medical alert device that you can use to call for help.  Use stepladders instead of chairs to reach high objects. Don't climb if you're at risk for falls. Ask for help, if needed.  Wear the correct eyeglasses, if you need them.        Make your home safer.   Remove rugs, cords, clutter, and furniture from walkways.  Keep your house well lit. Use night-lights in hallways and bathrooms.  Install and use sturdy handrails on stairways.  Wear nonskid footwear, even inside. Don't walk barefoot or in socks without shoes.        Be safe outside.   Use handrails, curb cuts, and ramps whenever possible.  Keep your hands free by using a shoulder bag or backpack.  Try to walk in well-lit areas. Watch out for uneven ground, changes in pavement, and debris.  Be careful in the winter. Walk on the grass or gravel when  "sidewalks are slippery. Use de-icer on steps and walkways. Add non-slip devices to shoes.    Put grab bars and nonskid mats in your shower or tub and near the toilet. Try to use a shower chair or bath bench when bathing.   Get into a tub or shower by putting in your weaker leg first. Get out with your strong side first. Have a phone or medical alert device in the bathroom with you.   Where can you learn more?  Go to https://www.OpTrip.net/patiented  Enter G117 in the search box to learn more about \"Preventing Falls: Care Instructions.\"  Current as of: July 31, 2024  Content Version: 14.5 2024-2025 [employer].   Care instructions adapted under license by your healthcare professional. If you have questions about a medical condition or this instruction, always ask your healthcare professional. [employer] disclaims any warranty or liability for your use of this information.    Hearing Loss: Care Instructions  Overview     Hearing loss is a sudden or slow decrease in how well you hear. It can range from slight to profound. Permanent hearing loss can occur with aging. It also can happen when you are exposed long-term to loud noise. Examples include listening to loud music, riding motorcycles, or being around other loud machines.  Hearing loss can affect your work and home life. It can make you feel lonely or depressed. You may feel that you have lost your independence. But hearing aids and other devices can help you hear better and feel connected to others.  Follow-up care is a key part of your treatment and safety. Be sure to make and go to all appointments, and call your doctor if you are having problems. It's also a good idea to know your test results and keep a list of the medicines you take.  How can you care for yourself at home?  Avoid loud noises whenever possible. This helps keep your hearing from getting worse.  Always wear hearing protection around loud noises.  Wear a hearing aid " "as directed.  A professional can help you pick a hearing aid that will work best for you.  You can also get hearing aids over the counter for mild to moderate hearing loss.  Have hearing tests as your doctor suggests. They can show whether your hearing has changed. Your hearing aid may need to be adjusted.  Use other devices as needed. These may include:  Telephone amplifiers and hearing aids that can connect to a television, stereo, radio, or microphone.  Devices that use lights or vibrations. These alert you to the doorbell, a ringing telephone, or a baby monitor.  Television closed-captioning. This shows the words at the bottom of the screen. Most new TVs can do this.  TTY (text telephone). This lets you type messages back and forth on the telephone instead of talking or listening. These devices are also called TDD. When messages are typed on the keyboard, they are sent over the phone line to a receiving TTY. The message is shown on a monitor.  Use text messaging, social media, and email if it is hard for you to communicate by telephone.  Try to learn a listening technique called speechreading. It is not lipreading. You pay attention to people's gestures, expressions, posture, and tone of voice. These clues can help you understand what a person is saying. Face the person you are talking to, and have them face you. Make sure the lighting is good. You need to see the other person's face clearly.  Think about counseling if you need help to adjust to your hearing loss.  When should you call for help?  Watch closely for changes in your health, and be sure to contact your doctor if:    You think your hearing is getting worse.     You have new symptoms, such as dizziness or nausea.   Where can you learn more?  Go to https://www.healthwise.net/patiented  Enter R798 in the search box to learn more about \"Hearing Loss: Care Instructions.\"  Current as of: October 27, 2024  Content Version: 14.5 2024-2025 [employer] " "[employer].   Care instructions adapted under license by your healthcare professional. If you have questions about a medical condition or this instruction, always ask your healthcare professional. [facility] [employer] disclaims any warranty or liability for your use of this information.    9 Ways to Cut Back on Drinking  Maybe you've found yourself drinking more alcohol than you'd prefer. If you want to cut back, here are some ideas to try.    Think before you drink.  Do you really want a drink, or is it just a habit? If you're used to having a drink at a certain time, try doing something else then.     Look for substitutes.  Find some no-alcohol drinks that you enjoy, like flavored seltzer water, tea with honey, or tonic with a slice of lime. Or try alcohol-free beer or \"virgin\" cocktails (without the alcohol).     Drink more water.  Use water to quench your thirst. Drink a glass of water before you have any alcohol. Have another glass along with every drink or between drinks.     Shrink your drink.  For example, have a bottle of beer instead of a pint. Use a smaller glass for wine. Choose drinks with lower alcohol content (ABV%). Or use less liquor and more mixer in cocktails.     Slow down.  It's easy to drink quickly and without thinking about it. Pay attention, and make each drink last longer.     Do the math.  Total up how much you spend on alcohol each month. How much is that a year? If you cut back, what could you do with the money you save?     Take a break.  Choose a day or two each week when you won't drink at all. Notice how you feel on those days, physically and emotionally. How did you sleep? Do you feel better? Over time, add more break days.     Count calories.  Would you like to lose some weight? For some people that's a good motivator for cutting back. Figure out how many calories are in each drink. How many does that add up to in a day? In a week? In a month?     Practice saying no.  Be " "ready when someone offers you a drink. Try: \"Thanks, I've had enough.\" Or \"Thanks, but I'm cutting back.\" Or \"No, thanks. I feel better when I drink less.\"   Current as of: August 20, 2024  Content Version: 14.5    8041-4459 [employer].   Care instructions adapted under license by your healthcare professional. If you have questions about a medical condition or this instruction, always ask your healthcare professional. [employer] disclaims any warranty or liability for your use of this information.  Eating Healthy Foods: Care Instructions  With every meal, you can make healthy food choices. Try to eat a variety of fruits, vegetables, whole grains, lean proteins, and low-fat dairy products. This can help you get the right balance of nutrients, including vitamins and minerals. Small changes add up over time. You can start by adding one healthy food to your meals each day.    Try to make half your plate fruits and vegetables, one-fourth whole grains, and one-fourth lean proteins. Try including dairy with your meals.   Eat more fruits and vegetables. Try to have them with most meals and snacks.   Foods for healthy eating        Fruits   These can be fresh, frozen, canned, or dried.  Try to choose whole fruit rather than fruit juice.  Eat a variety of colors.        Vegetables   These can be fresh, frozen, canned, or dried.  Beans, peas, and lentils count too.        Whole grains   Choose whole-grain breads, cereals, and noodles.  Try brown rice.        Lean proteins   These can include lean meat, poultry, fish, and eggs.  You can also have tofu, beans, peas, lentils, nuts, and seeds.        Dairy   Try milk, yogurt, and cheese.  Choose low-fat or fat-free when you can.  If you need to, use lactose-free milk or fortified plant-based milk products, such as soy milk.        Water   Drink water when you're thirsty.  Limit sugar-sweetened drinks, including soda, fruit drinks, and sports drinks.  Where " "can you learn more?  Go to https://www.Funsherpa.net/patiented  Enter T756 in the search box to learn more about \"Eating Healthy Foods: Care Instructions.\"  Current as of: October 7, 2024  Content Version: 14.5 2024-2025 [employer].   Care instructions adapted under license by your healthcare professional. If you have questions about a medical condition or this instruction, always ask your healthcare professional. [employer] disclaims any warranty or liability for your use of this information.    Learning About Being Active as an Older Adult  Why is being active important as you get older?     Being active is one of the best things you can do for your health. And it's never too late to start. Being active--or getting active, if you aren't already--has definite benefits. It can:  Give you more energy,  Keep your mind sharp.  Improve balance to reduce your risk of falls.  Help you manage chronic illness with fewer medicines.  No matter how old you are, how fit you are, or what health problems you have, there is a form of activity that will work for you. And the more physical activity you can do, the better your overall health will be.  What kinds of activity can help you stay healthy?  Being more active will make your daily activities easier. Physical activity includes planned exercise and things you do in daily life. There are four types of activity:  Aerobic.  Doing aerobic activity makes your heart and lungs strong.  Includes walking, dancing, and gardening.  Aim for at least 2  hours spread throughout the week.  It improves your energy and can help you sleep better.  Muscle-strengthening.  This type of activity can help maintain muscle and strengthen bones.  Includes climbing stairs, using resistance bands, and lifting or carrying heavy loads.  Aim for at least twice a week.  It can help protect the knees and other joints.  Stretching.  Stretching gives you better range of motion in " joints and muscles.  Includes upper arm stretches, calf stretches, and gentle yoga.  Aim for at least twice a week, preferably after your muscles are warmed up from other activities.  It can help you function better in daily life.  Balancing.  This helps you stay coordinated and have good posture.  Includes heel-to-toe walking, raphael chi, and certain types of yoga.  Aim for at least 3 days a week.  It can reduce your risk of falling.  Even if you have a hard time meeting the recommendations, it's better to be more active than less active. All activity done in each category counts toward your weekly total. You'd be surprised how daily things like carrying groceries, keeping up with grandchildren, and taking the stairs can add up.  What keeps you from being active?  If you've had a hard time being more active, you're not alone. Maybe you remember being able to do more. Or maybe you've never thought of yourself as being active. It's frustrating when you can't do the things you want. Being more active can help. What's holding you back?  Getting started.  Have a goal, but break it into easy tasks. Small steps build into big accomplishments.  Staying motivated.  If you feel like skipping your activity, remember your goal. Maybe you want to move better and stay independent. Every activity gets you one step closer.  Not feeling your best.  Start with 5 minutes of an activity you enjoy. Prove to yourself you can do it. As you get comfortable, increase your time.  You may not be where you want to be. But you're in the process of getting there. Everyone starts somewhere.  How can you find safe ways to stay active?  Talk with your doctor about any physical challenges you're facing. Make a plan with your doctor if you have a health problem or aren't sure how to get started with activity.  If you're already active, ask your doctor if there is anything you should change to stay safe as your body and health change.  If you tend to  "feel dizzy after you take medicine, avoid activity at that time. Try being active before you take your medicine. This will reduce your risk of falls.  If you plan to be active at home, make sure to clear your space before you get started. Remove things like TV cords, coffee tables, and throw rugs. It's safest to have plenty of space to move freely.  The key to getting more active is to take it slow and steady. Try to improve only a little bit at a time. Pick just one area to improve on at first. And if an activity hurts, stop and talk to your doctor.  Where can you learn more?  Go to https://www.[employer].net/patiented  Enter P600 in the search box to learn more about \"Learning About Being Active as an Older Adult.\"  Current as of: July 31, 2024  Content Version: 14.5    8898-2194 [employer].   Care instructions adapted under license by your healthcare professional. If you have questions about a medical condition or this instruction, always ask your healthcare professional. [employer] disclaims any warranty or liability for your use of this information.       "

## 2025-07-14 NOTE — PROGRESS NOTES
Preventive Care Visit  [facility]  SUE Barillas CNP, Family Medicine  Jul 14, 2025          Assessment & Plan     (Z00.00) Encounter for Medicare annual wellness exam  (primary encounter diagnosis)  Comment: See patient instructions.  Plan: REVIEW OF HEALTH MAINTENANCE PROTOCOL ORDERS    (Z12.5) Screening for prostate cancer  Comment:   Plan: PSA, screen    (Z12.11) Screen for colon cancer  Comment:   Plan: Colonoscopy Screening  Referral    (Z13.6) Screening for cardiovascular condition  Comment:     (Z13.6) CARDIOVASCULAR SCREENING; LDL GOAL LESS THAN 130  Comment:     (Z13.6) Screening for AAA (abdominal aortic aneurysm)  Comment:   Plan: US Abdominal Aorta    (Z12.2) Screening for lung cancer  Comment:   Plan: CT Chest Lung Cancer Screen Low Dose Without    (I10) Benign essential hypertension  Comment: Chronic.  Uncontrolled.  Will increase losartan to 50 mg once daily.  Recheck in 2 weeks for BP check. I will have nurses call him with the plan. We did briefly discuss this in clinic, but waited for lab results.   Plan: CBC with Platelets & Differential,         Comprehensive metabolic panel, TSH with free T4        reflex    (E78.5) Hyperlipidemia LDL goal <130  Comment: Chronic.  Stable.  last LDL in 2021 was 87.  Recheck today.  Plan: Lipid panel reflex to direct LDL Fasting    (R73.9) Elevated blood sugar  Comment: Chronic.  Persistent.  Will recheck today along with A1c.  Plan: Comprehensive metabolic panel, Hemoglobin A1c    (R06.83) Snoring  Comment: Chronic.  Persistent.  Sleep Eval ordered.  Plan: US Abdominal Aorta    (G47.33) KAJAL (obstructive sleep apnea)  Comment: Chrnonic. Persistent.  Would like consult for further evaluation.  Plan: Adult Sleep Eval & Management Referral    (D22.9) Change in skin mole  Comment: Chronic.  Persistent.  Several moles on back.  One mole is getting larger and darker.  Derm referral placed.  Plan: Adult Dermatology   Referral    (R21) Rash and nonspecific skin eruption  Comment: Chronic.  Persistent.  Both lower legs; right worse than left.  Plan: Adult Dermatology  Referral    (Z72.0) Tobacco use  Comment: Chronic.  Persistent.  1 PPD.  Plan: CBC with Platelets & Differential,         Comprehensive metabolic panel, CT Chest Lung         Cancer Screen Low Dose Without    (Z87.891) Personal history of nicotine dependence  Comment: Chronic.  Persistent.  1 PPD.  Plan: CT Chest Lung Cancer Screen Low Dose Without    (E66.812,  E66.01,  Z68.36) Class 2 severe obesity due to excess calories with serious comorbidity and body mass index (BMI) of 36.0 to 36.9 in adult (H)  Comment: Chronic.  Persistent.  Discussed diet and exercise.       Patient Instructions:    Take your meds as prescribed.    Scripts / refills sent to your pharmacy.     See Dermatology.      See Sleep Clinic.    Chest CT, Abdominal Ultrasound ordered.     Colonoscopy and Fit test ordered.     PCV20 shot at pharmacy.     Think about RSV shot as well.      Increase water (48-64 ounces per day), fruits, and vegetables.    Exercise at least 5 days per week for 30 minutes each day as tolerated.      Follow-up in 1 month or sooner as needed.      Call or MyChart as needed as well.        The longitudinal plan of care for the diagnosis(es)/condition(s) as documented were addressed during this visit. Due to the added complexity in care, I will continue to support Enoch in the subsequent management and with ongoing continuity of care.      Patient has been advised of split billing requirements and indicates understanding: Yes      Counseling  Appropriate preventive services were addressed with this patient via screening, questionnaire, or discussion as appropriate for fall prevention, nutrition, physical activity, Tobacco-use cessation, social engagement, weight loss and cognition.  Checklist reviewing preventive services available has been given to the  patient.  Reviewed patient's diet, addressing concerns and/or questions.   The patient was instructed to see the dentist every 6 months.   The patient reports drinking more than 3 alcoholic drinks per day and/or more than 7 drhnks per week. The patient was counseled and given information about possible harmful effects of excessive alcohol intake.Patient reported safety concerns were addressed today.The patient was provided with written information regarding signs of hearing loss.   Reviewed preventive health counseling, as reflected in patient instructions       Regular exercise       Healthy diet/nutrition        Subjective   Enoch is a 70 year old, presenting for the following:  Physical        7/14/2025     9:15 AM   Additional Questions   Roomed by lesvia cevallos      HPI:  Sexually active with wife.  No changes in partners.  Uses nothing for birth control or protection.  No concerns with sexual health today.        Advance Care Planning    Discussed advance care planning with patient; informed AVS has link to [facility].        7/13/2025   General Health   How would you rate your overall physical health? (!) FAIR   Feel stress (tense, anxious, or unable to sleep) Not at all         7/13/2025   Nutrition   Diet: Regular (no restrictions)         7/13/2025   Exercise   Days per week of moderate/strenous exercise 4 days   Average minutes spent exercising at this level 40 min         7/13/2025   Social Factors   Frequency of gathering with friends or relatives Once a week   Worry food won't last until get money to buy more No   Food not last or not have enough money for food? No   Do you have housing? (Housing is defined as stable permanent housing and does not include staying outside in a car, in a tent, in an abandoned building, in an overnight shelter, or couch-surfing.) Yes   Are you worried about losing your housing? No   Lack of transportation? No   Unable to get utilities (heat,electricity)? No          7/14/2025   Fall Risk   Gait Speed Test (Document in seconds) 6   Gait Speed Test Interpretation Less than or equal to 5.00 seconds - PASS          7/13/2025   Activities of Daily Living- Home Safety   Needs help with the following daily activites None of the above   Safety concerns in the home No grab bars in the bathroom         7/13/2025   Dental   Dentist two times every year? (!) NO         7/13/2025   Hearing Screening   Hearing concerns? (!) I FEEL THAT PEOPLE ARE MUMBLING OR NOT SPEAKING CLEARLY.    (!) I NEED TO ASK PEOPLE TO SPEAK UP OR REPEAT THEMSELVES.    (!) IT'S HARD TO FOLLOW A CONVERSATION IN A NOISY RESTAURANT OR CROWDED ROOM.    (!) TROUBLE UNDERSTANDING SOFT OR WHISPERED SPEECH.   Would you like a referral for hearing testing? I already have hearing aids       Multiple values from one day are sorted in reverse-chronological order         7/13/2025   Driving Risk Screening   Patient/family members have concerns about driving No         7/13/2025   General Alertness/Fatigue Screening   Have you been more tired than usual lately? No         7/13/2025   Urinary Incontinence Screening   Bothered by leaking urine in past 6 months No         Today's PHQ-2 Score:       7/13/2025     9:31 AM   PHQ-2 ( 1999 [employer])   Q1: Little interest or pleasure in doing things 0   Q2: Feeling down, depressed or hopeless 0   PHQ-2 Score 0    Q1: Little interest or pleasure in doing things Not at all   Q2: Feeling down, depressed or hopeless Not at all   PHQ-2 Score 0       Patient-reported           7/13/2025   Substance Use   If I could quit smoking, I would Completely agree   I want to quit somking, worry about health affects Completely agree   Willing to make a plan to quit smoking Completely agree   Willing to cut down before quitting Completely agree   Alcohol more than 3/day or more than 7/wk Yes   How often do you have a drink containing alcohol 4 or more times a week   How many alcohol drinks on typical day 1 or  2   How often do you have 5+ drinks at one occasion Never   Audit 2/3 Score 0   How often not able to stop drinking once started Never   How often failed to do what normally expected Never   How often needed first drink in am after a heavy drinking session Never   How often feeling of guilt or remorse after drinking Never   How often unable to remember what happened the night before Never   Have you or someone else been injured because of your drinking No   Has anyone been concerned or suggested you cut down on drinking No   TOTAL SCORE - AUDIT 4   Do you have a current opioid prescription? No   How severe/bad is pain from 1 to 10? 1/10   Do you use any other substances recreationally? No       Social History     Tobacco Use    Smoking status: Every Day     Current packs/day: 1.00     Average packs/day: 1 pack/day for 40.0 years (40.0 ttl pk-yrs)     Types: Cigarettes    Smokeless tobacco: Never   Vaping Use    Vaping status: Never Used   Substance Use Topics    Alcohol use: Yes     Alcohol/week: 0.8 standard drinks of alcohol     Types: 1 Cans of beer per week     Comment: daily- 1 beer a day     Drug use: No         Last PSA:   PSA   Date Value Ref Range Status   02/10/2021 1.39 0 - 4 ug/L Final     Comment:     Assay Method:  Chemiluminescence using Siemens Vista analyzer     Prostate Specific Antigen Screen   Date Value Ref Range Status   07/14/2025 1.29 0.00 - 4.50 ng/mL Final       ASCVD Risk   The 10-year ASCVD risk score (Francesca CONTI, et al., 2019) is: 26.5%    Values used to calculate the score:      Age: 70 years      Sex: Male      Is Non- : No      Diabetic: No      Tobacco smoker: Yes      Systolic Blood Pressure: 152 mmHg      Is BP treated: Yes      HDL Cholesterol: 56 mg/dL      Total Cholesterol: 156 mg/dL          Reviewed and updated as needed this visit by Provider    Allergies  Meds  Problems  Med Hx  Surg Hx  Fam Hx            Past Medical History:    Diagnosis Date    Diabetes (H) 2016    Past high readings for Glucose, need to get it checked    Elevated blood pressure reading without diagnosis of hypertension 07/22/2016    Elevated blood sugar 07/25/2016    Heart disease 2016    Issues with palpitation in the past seem to have resolved    Hypertension 2019    Last reading was high    Palpitations 07/22/2016    Sleep apnea 20 years ago     had study [facility] of [facility]     Tobacco use        Past Surgical History:   Procedure Laterality Date    HERNIA REPAIR  1971       Patient Active Problem List   Diagnosis    KAJAL (obstructive sleep apnea)    CARDIOVASCULAR SCREENING; LDL GOAL LESS THAN 130    Tobacco use    Elevated blood sugar    Hyperlipidemia LDL goal <130    Class 2 severe obesity due to excess calories with serious comorbidity and body mass index (BMI) of 36.0 to 36.9 in adult (H)    Benign essential hypertension    Personal history of nicotine dependence     Past Surgical History:   Procedure Laterality Date    HERNIA REPAIR  1971       Social History     Tobacco Use    Smoking status: Every Day     Current packs/day: 1.00     Average packs/day: 1 pack/day for 40.0 years (40.0 ttl pk-yrs)     Types: Cigarettes    Smokeless tobacco: Never   Substance Use Topics    Alcohol use: Yes     Alcohol/week: 0.8 standard drinks of alcohol     Types: 1 Cans of beer per week     Comment: daily- 1 beer a day      Family History   Problem Relation Age of Onset    Heart Disease Mother     Diabetes Mother     Respiratory Mother     Coronary Artery Disease Mother     Cerebrovascular Disease Mother     Respiratory Father     Depression Brother         Lifelong struggle with it           Current Outpatient Medications   Medication Sig Dispense Refill    losartan (COZAAR) 25 MG tablet Take 1 tablet (25 mg) by mouth daily. 60 tablet 0       No Known Allergies    Current providers sharing in care for this patient include:  Patient Care Team:  Alia Sharif APRN CNP as  PCP - General (Family Medicine)  Clinic - [place], [facility] as Assigned PCP      The following health maintenance items are reviewed in Epic and correct as of today:  Health Maintenance   Topic Date Due    COLORECTAL CANCER SCREENING  Never done    PNEUMOCOCCAL VACCINE 50+ YEARS (1 of 2 - PCV) Never done    DTAP/TDAP/TD VACCINE (1 - Tdap) Never done    ZOSTER VACCINE (1 of 2) Never done    LUNG CANCER SCREENING  Never done    AORTIC ANEURYSM SCREENING (SYSTEM ASSIGNED)  Never done    COVID-19 VACCINE (7 - 2024-25 season) 06/30/2025    INFLUENZA VACCINE (1) 09/01/2025    NICOTINE/TOBACCO CESSATION COUNSELING Q 1 YR  06/12/2026    MEDICARE ANNUAL WELLNESS VISIT  07/14/2026    BMP  07/14/2026    LIPID  07/14/2026    ANNUAL REVIEW OF HM ORDERS  07/14/2026    FALL RISK ASSESSMENT  07/14/2026    DIABETES SCREENING  07/14/2028    ADVANCE CARE PLANNING  07/17/2030    RSV VACCINE (1 - 1-dose 75+ series) 01/18/2031    HEPATITIS C SCREENING  Completed    PHQ-2 (once per calendar year)  Completed    HPV VACCINE  Aged Out    MENINGITIS VACCINE  Aged Out       Review of Systems  CONSTITUTIONAL: NEGATIVE for fever, chills, change in weight  INTEGUMENTARY/SKIN: NEGATIVE for worrisome rashes, moles or lesions  EYES: NEGATIVE for vision changes or irritation  ENT/MOUTH: NEGATIVE for ear, mouth and throat problems  RESP: NEGATIVE for significant cough or SOB  BREAST: NEGATIVE for masses, tenderness or discharge  CV: NEGATIVE for chest pain, palpitations or peripheral edema  GI: NEGATIVE for nausea, abdominal pain, heartburn, or change in bowel habits  : NEGATIVE for frequency, dysuria, or hematuria  MUSCULOSKELETAL: NEGATIVE for significant arthralgias or myalgia  NEURO: NEGATIVE for weakness, dizziness or paresthesias  ENDOCRINE: NEGATIVE for temperature intolerance, skin/hair changes  HEME: NEGATIVE for bleeding problems  PSYCHIATRIC: NEGATIVE for changes in mood or affect       Objective    Exam  BP (!) 152/80    "Pulse 77   Temp 98.7  F (37.1  C) (Oral)   Resp 18   Ht 1.753 m (5' 9\")   Wt 111.7 kg (246 lb 4.8 oz)   SpO2 99%   BMI 36.37 kg/m     Estimated body mass index is 36.37 kg/m  as calculated from the following:    Height as of this encounter: 1.753 m (5' 9\").    Weight as of this encounter: 111.7 kg (246 lb 4.8 oz).    Physical Exam  Constitutional:       General: He is not in acute distress.     Appearance: Normal appearance. He is obese. He is not ill-appearing.   HENT:      Head: Normocephalic.      Right Ear: Tympanic membrane, ear canal and external ear normal. There is no impacted cerumen.      Left Ear: Tympanic membrane, ear canal and external ear normal. There is no impacted cerumen.      Nose: Nose normal. No congestion or rhinorrhea.      Mouth/Throat:      Mouth: Mucous membranes are moist.      Pharynx: Oropharynx is clear. No oropharyngeal exudate or posterior oropharyngeal erythema.   Eyes:      General: No scleral icterus.        Right eye: No discharge.         Left eye: No discharge.      Extraocular Movements: Extraocular movements intact.      Conjunctiva/sclera: Conjunctivae normal.      Pupils: Pupils are equal, round, and reactive to light.   Neck:      Comments: Thyroid is normal shape and size and smooth.  No nodules or enlargement noted.  No pain noted.      Cardiovascular:      Rate and Rhythm: Normal rate and regular rhythm.      Heart sounds: Normal heart sounds. No murmur heard.     No friction rub. No gallop.   Pulmonary:      Effort: Pulmonary effort is normal. No respiratory distress.      Breath sounds: Normal breath sounds. No stridor. No wheezing, rhonchi or rales.   Chest:      Chest wall: No tenderness.   Abdominal:      General: Abdomen is flat. Bowel sounds are normal. There is no distension.      Palpations: Abdomen is soft. There is no mass.      Tenderness: There is no abdominal tenderness. There is no right CVA tenderness, left CVA tenderness, guarding or rebound.      " Hernia: No hernia is present.   Genitourinary:     Comments: Declines testicular and prostate test.   Musculoskeletal:         General: No swelling, tenderness or deformity. Normal range of motion.      Cervical back: Normal range of motion and neck supple. No rigidity or tenderness.   Lymphadenopathy:      Cervical: No cervical adenopathy.   Skin:     General: Skin is warm and dry.      Capillary Refill: Capillary refill takes less than 2 seconds.      Coloration: Skin is not jaundiced.      Findings: Rash present. No erythema.      Comments: Rash of both lower legs; right worse than his left.  Black and blue looking  Dryness noted.     Back with 3-4 significant moles; he notes that they appear darker.    Neurological:      General: No focal deficit present.      Mental Status: He is alert and oriented to person, place, and time.      Cranial Nerves: No cranial nerve deficit.      Motor: No weakness.      Coordination: Coordination normal.      Gait: Gait normal.      Deep Tendon Reflexes: Reflexes normal.   Psychiatric:         Mood and Affect: Mood normal.         Behavior: Behavior normal.         Thought Content: Thought content normal.         Judgment: Judgment normal.              7/14/2025   Mini Cog   Clock Draw Score 2 Normal   3 Item Recall 3 objects recalled   Mini Cog Total Score 5         Vision Screen  Vision Screen Results: (!) REFER          Signed Electronically by: SUE Barillas CNP

## 2025-07-15 ENCOUNTER — PATIENT OUTREACH (OUTPATIENT)
Dept: CARE COORDINATION | Facility: CLINIC | Age: 70
End: 2025-07-15
Payer: COMMERCIAL

## 2025-07-15 LAB
ALBUMIN SERPL BCG-MCNC: 4.2 G/DL (ref 3.5–5.2)
ALP SERPL-CCNC: 64 U/L (ref 40–150)
ALT SERPL W P-5'-P-CCNC: 22 U/L (ref 0–70)
ANION GAP SERPL CALCULATED.3IONS-SCNC: 6 MMOL/L (ref 7–15)
AST SERPL W P-5'-P-CCNC: 30 U/L (ref 0–45)
BILIRUB SERPL-MCNC: 0.7 MG/DL
BUN SERPL-MCNC: 15.9 MG/DL (ref 8–23)
CALCIUM SERPL-MCNC: 9.3 MG/DL (ref 8.8–10.4)
CHLORIDE SERPL-SCNC: 100 MMOL/L (ref 98–107)
CHOLEST SERPL-MCNC: 156 MG/DL
CREAT SERPL-MCNC: 0.98 MG/DL (ref 0.67–1.17)
EGFRCR SERPLBLD CKD-EPI 2021: 83 ML/MIN/1.73M2
FASTING STATUS PATIENT QL REPORTED: YES
FASTING STATUS PATIENT QL REPORTED: YES
GLUCOSE SERPL-MCNC: 108 MG/DL (ref 70–99)
HCO3 SERPL-SCNC: 30 MMOL/L (ref 22–29)
HDLC SERPL-MCNC: 56 MG/DL
LDLC SERPL CALC-MCNC: 89 MG/DL
NONHDLC SERPL-MCNC: 100 MG/DL
POTASSIUM SERPL-SCNC: 4.9 MMOL/L (ref 3.4–5.3)
PROT SERPL-MCNC: 7 G/DL (ref 6.4–8.3)
PSA SERPL DL<=0.01 NG/ML-MCNC: 1.29 NG/ML (ref 0–4.5)
SODIUM SERPL-SCNC: 136 MMOL/L (ref 135–145)
TRIGL SERPL-MCNC: 55 MG/DL
TSH SERPL DL<=0.005 MIU/L-ACNC: 1.17 UIU/ML (ref 0.3–4.2)

## 2025-07-17 ENCOUNTER — TELEPHONE (OUTPATIENT)
Dept: FAMILY MEDICINE | Facility: CLINIC | Age: 70
End: 2025-07-17
Payer: COMMERCIAL

## 2025-07-17 ENCOUNTER — PATIENT OUTREACH (OUTPATIENT)
Dept: CARE COORDINATION | Facility: CLINIC | Age: 70
End: 2025-07-17
Payer: COMMERCIAL

## 2025-07-17 RX ORDER — LOSARTAN POTASSIUM 50 MG/1
50 TABLET ORAL DAILY
Qty: 90 TABLET | Refills: 0 | Status: SHIPPED | OUTPATIENT
Start: 2025-07-17

## 2025-07-18 NOTE — TELEPHONE ENCOUNTER
Please call patient with message:      Your BP was still elevated in clinic.  We briefly discussed increasing your losartan.    Since your labs came back normal / stable, I want to go through with the increase in losartan.  I sent a script for losartan 50 mg once daily.  You can get the new script today or whenever.  You can use up the meds that you have at home, but would take 2 tabs once daily for a total of 50 mg.  The new script is a 50 mg tab (so only 1 tab) is needed.  Please read the pill bottle.      Please see me in 2-3 weeks.  The nurses can help you get scheduled.  Can take  SOLANGE slot as needed.       Take care,   SUE Barillas CNP

## 2025-07-18 NOTE — TELEPHONE ENCOUNTER
Contacts       Contact Date/Time Type Contact Phone/Fax    07/18/2025 09:05 AM CDT Phone (Outgoing) Enoch Camarena (Self) [phone] (H)    Left Message           Attempted to reach patient to: Relay a message    Regarding: Your BP was still elevated in clinic.  We briefly discussed increasing your losartan.    Since your labs came back normal / stable, I want to go through with the increase in losartan.  I sent a script for losartan 50 mg once daily.  You can get the new script today or whenever.  You can use up the meds that you have at home, but would take 2 tabs once daily for a total of 50 mg.  The new script is a 50 mg tab (so only 1 tab) is needed.  Please read the pill bottle.       Please see me in 2-3 weeks.  The nurses can help you get scheduled.  Can take  SOLANGE slot as needed.     Action to take: Transfer to site RN green line (or update telephone encounter in after-hours)      Kayla Fisher RN BSN  Clinic Nurse  [facility]

## 2025-07-21 NOTE — TELEPHONE ENCOUNTER
Attempt x 2. Called pt and left a message to call back to [phone] and to ask to speak to a nurse.     [employer] message sent to patient      When pt calls back, please:  Relay message from SUE Aguilar CNP below:  Your BP was still elevated in clinic.  We briefly discussed increasing your losartan.      Since your labs came back normal / stable, I want to go through with the increase in losartan.  I sent a script for losartan 50 mg once daily.  You can get the new script today or whenever.  You can use up the meds that you have at home, but would take 2 tabs once daily for a total of 50 mg.  The new script is a 50 mg tab (so only 1 tab) is needed.  Please read the pill bottle.      Please see me in 2-3 weeks.  The nurses can help you get scheduled.  Can take  SOLANGE slot as needed.     Schedule appt with SUE Aguilar CNP in 2-3 weeks       Veronica CROFT RN  [facility]

## 2025-07-22 NOTE — TELEPHONE ENCOUNTER
Pt has viewed [employer] message. Closing encounter.     Perla REYNA RN   Clinic RN  [facility]

## 2025-08-04 ENCOUNTER — OFFICE VISIT (OUTPATIENT)
Dept: SURGERY | Facility: CLINIC | Age: 70
End: 2025-08-04
Payer: COMMERCIAL

## 2025-08-04 VITALS
HEART RATE: 71 BPM | HEIGHT: 69 IN | DIASTOLIC BLOOD PRESSURE: 72 MMHG | SYSTOLIC BLOOD PRESSURE: 154 MMHG | BODY MASS INDEX: 36.43 KG/M2 | OXYGEN SATURATION: 93 % | WEIGHT: 246 LBS

## 2025-08-04 DIAGNOSIS — K42.9 UMBILICAL HERNIA WITHOUT OBSTRUCTION AND WITHOUT GANGRENE: Primary | ICD-10-CM

## 2025-08-04 PROCEDURE — 3077F SYST BP >= 140 MM HG: CPT | Performed by: STUDENT IN AN ORGANIZED HEALTH CARE EDUCATION/TRAINING PROGRAM

## 2025-08-04 PROCEDURE — 3078F DIAST BP <80 MM HG: CPT | Performed by: STUDENT IN AN ORGANIZED HEALTH CARE EDUCATION/TRAINING PROGRAM

## 2025-08-04 PROCEDURE — 99203 OFFICE O/P NEW LOW 30 MIN: CPT | Performed by: STUDENT IN AN ORGANIZED HEALTH CARE EDUCATION/TRAINING PROGRAM

## 2025-08-05 ENCOUNTER — PATIENT OUTREACH (OUTPATIENT)
Dept: CARE COORDINATION | Facility: CLINIC | Age: 70
End: 2025-08-05
Payer: COMMERCIAL

## 2025-08-07 ENCOUNTER — PATIENT OUTREACH (OUTPATIENT)
Dept: CARE COORDINATION | Facility: CLINIC | Age: 70
End: 2025-08-07
Payer: COMMERCIAL

## 2025-08-08 ENCOUNTER — HOSPITAL ENCOUNTER (OUTPATIENT)
Dept: CARDIOLOGY | Facility: CLINIC | Age: 70
Discharge: HOME | End: 2025-08-08
Payer: COMMERCIAL

## 2025-08-08 DIAGNOSIS — R01.1 HEART MURMUR: ICD-10-CM

## 2025-08-08 LAB — LVEF ECHO: NORMAL

## 2025-08-08 PROCEDURE — 93306 TTE W/DOPPLER COMPLETE: CPT | Mod: 26 | Performed by: INTERNAL MEDICINE

## 2025-08-08 PROCEDURE — 93306 TTE W/DOPPLER COMPLETE: CPT

## 2025-08-18 ENCOUNTER — PATIENT OUTREACH (OUTPATIENT)
Dept: CARE COORDINATION | Facility: CLINIC | Age: 70
End: 2025-08-18
Payer: COMMERCIAL